# Patient Record
Sex: MALE | Race: OTHER | NOT HISPANIC OR LATINO | URBAN - METROPOLITAN AREA
[De-identification: names, ages, dates, MRNs, and addresses within clinical notes are randomized per-mention and may not be internally consistent; named-entity substitution may affect disease eponyms.]

---

## 2019-08-10 ENCOUNTER — INPATIENT (INPATIENT)
Facility: HOSPITAL | Age: 69
LOS: 2 days | Discharge: HOME | DRG: 247 | End: 2019-08-13
Attending: HOSPITALIST | Admitting: HOSPITALIST
Payer: COMMERCIAL

## 2019-08-10 VITALS
HEART RATE: 86 BPM | OXYGEN SATURATION: 99 % | WEIGHT: 207.01 LBS | RESPIRATION RATE: 18 BRPM | SYSTOLIC BLOOD PRESSURE: 173 MMHG | HEIGHT: 72 IN | DIASTOLIC BLOOD PRESSURE: 99 MMHG

## 2019-08-10 DIAGNOSIS — Z29.9 ENCOUNTER FOR PROPHYLACTIC MEASURES, UNSPECIFIED: ICD-10-CM

## 2019-08-10 DIAGNOSIS — I21.4 NON-ST ELEVATION (NSTEMI) MYOCARDIAL INFARCTION: ICD-10-CM

## 2019-08-10 DIAGNOSIS — R07.9 CHEST PAIN, UNSPECIFIED: ICD-10-CM

## 2019-08-10 DIAGNOSIS — Z87.19 PERSONAL HISTORY OF OTHER DISEASES OF THE DIGESTIVE SYSTEM: ICD-10-CM

## 2019-08-10 LAB
ALBUMIN SERPL ELPH-MCNC: 4.6 G/DL — SIGNIFICANT CHANGE UP (ref 3.3–5)
ALP SERPL-CCNC: 67 U/L — SIGNIFICANT CHANGE UP (ref 40–120)
ALT FLD-CCNC: 26 U/L — SIGNIFICANT CHANGE UP (ref 10–45)
ANION GAP SERPL CALC-SCNC: 13 MMOL/L — SIGNIFICANT CHANGE UP (ref 5–17)
APTT BLD: 32.6 SEC — SIGNIFICANT CHANGE UP (ref 27.5–36.3)
APTT BLD: 50.6 SEC — HIGH (ref 27.5–36.3)
APTT BLD: 54.7 SEC — HIGH (ref 27.5–36.3)
AST SERPL-CCNC: 24 U/L — SIGNIFICANT CHANGE UP (ref 10–40)
BASOPHILS # BLD AUTO: 0 K/UL — SIGNIFICANT CHANGE UP (ref 0–0.2)
BASOPHILS NFR BLD AUTO: 0.1 % — SIGNIFICANT CHANGE UP (ref 0–2)
BILIRUB SERPL-MCNC: 0.4 MG/DL — SIGNIFICANT CHANGE UP (ref 0.2–1.2)
BUN SERPL-MCNC: 15 MG/DL — SIGNIFICANT CHANGE UP (ref 7–23)
CALCIUM SERPL-MCNC: 9.6 MG/DL — SIGNIFICANT CHANGE UP (ref 8.4–10.5)
CHLORIDE SERPL-SCNC: 101 MMOL/L — SIGNIFICANT CHANGE UP (ref 96–108)
CK MB BLD-MCNC: 3 % — SIGNIFICANT CHANGE UP (ref 0–3.5)
CK MB CFR SERPL CALC: 2.6 NG/ML — SIGNIFICANT CHANGE UP (ref 0–6.7)
CK MB CFR SERPL CALC: 3.2 NG/ML — SIGNIFICANT CHANGE UP (ref 0–6.7)
CK SERPL-CCNC: 86 U/L — SIGNIFICANT CHANGE UP (ref 30–200)
CO2 SERPL-SCNC: 25 MMOL/L — SIGNIFICANT CHANGE UP (ref 22–31)
CREAT SERPL-MCNC: 0.98 MG/DL — SIGNIFICANT CHANGE UP (ref 0.5–1.3)
EOSINOPHIL # BLD AUTO: 0.1 K/UL — SIGNIFICANT CHANGE UP (ref 0–0.5)
EOSINOPHIL NFR BLD AUTO: 1.4 % — SIGNIFICANT CHANGE UP (ref 0–6)
GLUCOSE SERPL-MCNC: 125 MG/DL — HIGH (ref 70–99)
HBA1C BLD-MCNC: 5.7 % — HIGH (ref 4–5.6)
HCT VFR BLD CALC: 44.3 % — SIGNIFICANT CHANGE UP (ref 39–50)
HCT VFR BLD CALC: 46.2 % — SIGNIFICANT CHANGE UP (ref 39–50)
HGB BLD-MCNC: 15.1 G/DL — SIGNIFICANT CHANGE UP (ref 13–17)
HGB BLD-MCNC: 15.2 G/DL — SIGNIFICANT CHANGE UP (ref 13–17)
INR BLD: 1.14 RATIO — SIGNIFICANT CHANGE UP (ref 0.88–1.16)
LYMPHOCYTES # BLD AUTO: 2.3 K/UL — SIGNIFICANT CHANGE UP (ref 1–3.3)
LYMPHOCYTES # BLD AUTO: 24.1 % — SIGNIFICANT CHANGE UP (ref 13–44)
MCHC RBC-ENTMCNC: 28.9 PG — SIGNIFICANT CHANGE UP (ref 27–34)
MCHC RBC-ENTMCNC: 30.3 PG — SIGNIFICANT CHANGE UP (ref 27–34)
MCHC RBC-ENTMCNC: 32.7 GM/DL — SIGNIFICANT CHANGE UP (ref 32–36)
MCHC RBC-ENTMCNC: 34.3 GM/DL — SIGNIFICANT CHANGE UP (ref 32–36)
MCV RBC AUTO: 88.4 FL — SIGNIFICANT CHANGE UP (ref 80–100)
MCV RBC AUTO: 88.5 FL — SIGNIFICANT CHANGE UP (ref 80–100)
MONOCYTES # BLD AUTO: 0.5 K/UL — SIGNIFICANT CHANGE UP (ref 0–0.9)
MONOCYTES NFR BLD AUTO: 5.8 % — SIGNIFICANT CHANGE UP (ref 2–14)
NEUTROPHILS # BLD AUTO: 6.5 K/UL — SIGNIFICANT CHANGE UP (ref 1.8–7.4)
NEUTROPHILS NFR BLD AUTO: 68.5 % — SIGNIFICANT CHANGE UP (ref 43–77)
PLATELET # BLD AUTO: 213 K/UL — SIGNIFICANT CHANGE UP (ref 150–400)
PLATELET # BLD AUTO: 215 K/UL — SIGNIFICANT CHANGE UP (ref 150–400)
POTASSIUM SERPL-MCNC: 4 MMOL/L — SIGNIFICANT CHANGE UP (ref 3.5–5.3)
POTASSIUM SERPL-SCNC: 4 MMOL/L — SIGNIFICANT CHANGE UP (ref 3.5–5.3)
PROT SERPL-MCNC: 7.6 G/DL — SIGNIFICANT CHANGE UP (ref 6–8.3)
PROTHROM AB SERPL-ACNC: 13 SEC — HIGH (ref 10–12.9)
RBC # BLD: 5.01 M/UL — SIGNIFICANT CHANGE UP (ref 4.2–5.8)
RBC # BLD: 5.22 M/UL — SIGNIFICANT CHANGE UP (ref 4.2–5.8)
RBC # FLD: 11.6 % — SIGNIFICANT CHANGE UP (ref 10.3–14.5)
RBC # FLD: 11.7 % — SIGNIFICANT CHANGE UP (ref 10.3–14.5)
SODIUM SERPL-SCNC: 139 MMOL/L — SIGNIFICANT CHANGE UP (ref 135–145)
TROPONIN T, HIGH SENSITIVITY RESULT: 196 NG/L — HIGH (ref 0–51)
TROPONIN T, HIGH SENSITIVITY RESULT: 205 NG/L — HIGH (ref 0–51)
TROPONIN T, HIGH SENSITIVITY RESULT: 211 NG/L — HIGH (ref 0–51)
TROPONIN T, HIGH SENSITIVITY RESULT: 218 NG/L — HIGH (ref 0–51)
WBC # BLD: 9.3 K/UL — SIGNIFICANT CHANGE UP (ref 3.8–10.5)
WBC # BLD: 9.4 K/UL — SIGNIFICANT CHANGE UP (ref 3.8–10.5)
WBC # FLD AUTO: 9.3 K/UL — SIGNIFICANT CHANGE UP (ref 3.8–10.5)
WBC # FLD AUTO: 9.4 K/UL — SIGNIFICANT CHANGE UP (ref 3.8–10.5)

## 2019-08-10 PROCEDURE — 71046 X-RAY EXAM CHEST 2 VIEWS: CPT | Mod: 26

## 2019-08-10 PROCEDURE — 99223 1ST HOSP IP/OBS HIGH 75: CPT

## 2019-08-10 PROCEDURE — 99221 1ST HOSP IP/OBS SF/LOW 40: CPT

## 2019-08-10 PROCEDURE — 99291 CRITICAL CARE FIRST HOUR: CPT

## 2019-08-10 PROCEDURE — 99205 OFFICE O/P NEW HI 60 MIN: CPT | Mod: GC

## 2019-08-10 RX ORDER — TICAGRELOR 90 MG/1
90 TABLET ORAL EVERY 12 HOURS
Refills: 0 | Status: DISCONTINUED | OUTPATIENT
Start: 2019-08-10 | End: 2019-08-13

## 2019-08-10 RX ORDER — ASPIRIN/CALCIUM CARB/MAGNESIUM 324 MG
81 TABLET ORAL DAILY
Refills: 0 | Status: DISCONTINUED | OUTPATIENT
Start: 2019-08-10 | End: 2019-08-13

## 2019-08-10 RX ORDER — TICAGRELOR 90 MG/1
180 TABLET ORAL ONCE
Refills: 0 | Status: COMPLETED | OUTPATIENT
Start: 2019-08-10 | End: 2019-08-10

## 2019-08-10 RX ORDER — ATORVASTATIN CALCIUM 80 MG/1
80 TABLET, FILM COATED ORAL AT BEDTIME
Refills: 0 | Status: DISCONTINUED | OUTPATIENT
Start: 2019-08-10 | End: 2019-08-13

## 2019-08-10 RX ORDER — ASPIRIN/CALCIUM CARB/MAGNESIUM 324 MG
162 TABLET ORAL ONCE
Refills: 0 | Status: DISCONTINUED | OUTPATIENT
Start: 2019-08-10 | End: 2019-08-10

## 2019-08-10 RX ORDER — HEPARIN SODIUM 5000 [USP'U]/ML
INJECTION INTRAVENOUS; SUBCUTANEOUS
Qty: 25000 | Refills: 0 | Status: DISCONTINUED | OUTPATIENT
Start: 2019-08-10 | End: 2019-08-12

## 2019-08-10 RX ORDER — HEPARIN SODIUM 5000 [USP'U]/ML
4000 INJECTION INTRAVENOUS; SUBCUTANEOUS EVERY 6 HOURS
Refills: 0 | Status: DISCONTINUED | OUTPATIENT
Start: 2019-08-10 | End: 2019-08-12

## 2019-08-10 RX ORDER — METOPROLOL TARTRATE 50 MG
12.5 TABLET ORAL EVERY 12 HOURS
Refills: 0 | Status: DISCONTINUED | OUTPATIENT
Start: 2019-08-10 | End: 2019-08-13

## 2019-08-10 RX ORDER — HEPARIN SODIUM 5000 [USP'U]/ML
4000 INJECTION INTRAVENOUS; SUBCUTANEOUS ONCE
Refills: 0 | Status: COMPLETED | OUTPATIENT
Start: 2019-08-10 | End: 2019-08-10

## 2019-08-10 RX ADMIN — Medication 12.5 MILLIGRAM(S): at 17:56

## 2019-08-10 RX ADMIN — ATORVASTATIN CALCIUM 80 MILLIGRAM(S): 80 TABLET, FILM COATED ORAL at 21:15

## 2019-08-10 RX ADMIN — HEPARIN SODIUM 4000 UNIT(S): 5000 INJECTION INTRAVENOUS; SUBCUTANEOUS at 03:19

## 2019-08-10 RX ADMIN — Medication 1 TABLET(S): at 17:57

## 2019-08-10 RX ADMIN — TICAGRELOR 180 MILLIGRAM(S): 90 TABLET ORAL at 05:20

## 2019-08-10 RX ADMIN — Medication 81 MILLIGRAM(S): at 17:56

## 2019-08-10 RX ADMIN — HEPARIN SODIUM 1000 UNIT(S)/HR: 5000 INJECTION INTRAVENOUS; SUBCUTANEOUS at 03:20

## 2019-08-10 RX ADMIN — TICAGRELOR 90 MILLIGRAM(S): 90 TABLET ORAL at 17:57

## 2019-08-10 RX ADMIN — HEPARIN SODIUM 1200 UNIT(S)/HR: 5000 INJECTION INTRAVENOUS; SUBCUTANEOUS at 18:30

## 2019-08-10 NOTE — ED PROVIDER NOTE - NS ED ROS FT

## 2019-08-10 NOTE — ED PROVIDER NOTE - OBJECTIVE STATEMENT
70 yo M no pmhx pw chest pain. patient reports acute onset chest pain starting on monday night in left axilla after working out on an elliptical. reports pain exacerbated by exercise and position. does not radiate and is intermittent in nature for the last few days. episodes last for a couple hours at a time. denies hx of chest pain. not aw Dyspnea or palpitations. Denies fever, chills, sweats, fatigue, weight loss, Alcohol, tobacco, or illicit drug use. no hx of Vascular or pulmonary disease, history of ulcer, previous hospitalizations or evaluations for chest pain. no hx of blood clots, hx of cancer. no hx of stress testing or echocardiogram. took 4 baby aspirin at home. no chest pain right now

## 2019-08-10 NOTE — ED PROVIDER NOTE - CLINICAL SUMMARY MEDICAL DECISION MAKING FREE TEXT BOX
see attending attestation authored by me, Lalo Chowdhury MD see attending attestation authored by me, Lalo Chowdhury MD    70 yo pw chest pain, ekg shows twave inversions without prev comparison pt on monitor, enzymes, aspirin taken at home, cxr for pneumothorax or infiltrates, less likely PE with low risk on wells and PERC neg, unlikely dissection will likely admit for dynamic testing in setting of abnormal ekg

## 2019-08-10 NOTE — H&P ADULT - NSHPPHYSICALEXAM_GEN_ALL_CORE
Vital Signs Last 24 Hrs  T(C): 36.7 (10 Aug 2019 06:27), Max: 36.7 (10 Aug 2019 03:21)  T(F): 98.1 (10 Aug 2019 06:27), Max: 98.1 (10 Aug 2019 06:27)  HR: 72 (10 Aug 2019 06:27) (70 - 90)  BP: 138/82 (10 Aug 2019 06:27) (138/82 - 173/99)  BP(mean): --  RR: 16 (10 Aug 2019 06:27) (16 - 18)  SpO2: 100% (10 Aug 2019 06:27) (99% - 100%)    PHYSICAL EXAM:  GENERAL: NAD, well-groomed, well-developed  HEAD:  Atraumatic, Normocephalic  EYES: EOMI, PERRLA, conjunctiva and sclera clear  ENMT: No oropharyngeal exudates, erythema or lesions,  Moist mucous membranes, good dentition  NECK: Supple, no cervical lymphadenopathy, no JVD  NERVOUS SYSTEM:  Alert & Oriented X3, CN II-XII intact, 5/5 BUE and BLE motor strength, full sensation to light touch   CHEST/LUNG: Clear to auscultation bilaterally; No rales, no  rhonchi, no wheezing  Chest pain is not reproducible with palpation  HEART: Regular rate and rhythm; No murmurs, rubs, or gallops  ABDOMEN: Soft, Nontender, Nondistended  EXTREMITIES:  2+ radial and DP pulses, No clubbing, cyanosis, or edema  LYMPH: No lymphadenopathy noted  SKIN: No rashes or lesions

## 2019-08-10 NOTE — H&P ADULT - ASSESSMENT
This patient is a 69yoM with PMH of gastric ulcers who presents to the ED with complaint of chest pain. This patient is from [place] and travels for work. 5 days prior to admission, the patient was in [place], [place] for work and had exercised at a gym. He used the elliptical machine. The following day, he experienced new onset of pain at the L axilla, which felt a pulled muscle. He attributed the pain to having moved his arms vigorously while on the elliptical. The pain improved with massage. He had no lightheadedness, palpitations, SOB, cough, or radiation of the pain. He had recurrent episodes of the pain every 8-10 hours for the following 4 days. The patient traveled to [place] to visit his grandchildren and was advised by his daughter in law to visit the urgent care center. At urgent care, he was provided with 4 aspirin. He was found to have an abnormal EKG and referred to the ED for further evaluation.  The patient has no history of MI. His brother  in bed unexpectedly at age 45 and the death was attributed to heart disease, however this was not verified. The patient denies history of tobacco use and states that he can readily climb 2 flights of stairs without CP, palpitations, dyspnea or lightheadedness.    In the ED, T  , HR 86, /99, RR 18, SpO2 99%RA This patient is a 69yoM with PMH of gastric ulcers who presents to the ED with complaint of chest pain, found to have elevated troponins and TWI on EKG consistent with NSTEMI.

## 2019-08-10 NOTE — CONSULT NOTE ADULT - ATTENDING COMMENTS
Patient seen and examined. Agree with assessment and plan as outlined above. Patient with no PMH with brother who had suddenly  in his sleep in his 40s presents with chest pain after exertion and more recently with exertion. He was noted to have slowly uptrending HS-trop around 200. Patient is currently pain free. Given symptoms and trop would treat as NSTEMI. Heparin gtt, DAPT, Statin, beta blocker. Check echo. Plan for cath Monday. If symptoms return or patient becomes HD unstable will need cath emergently.

## 2019-08-10 NOTE — CHART NOTE - NSCHARTNOTEFT_GEN_A_CORE
Interval events    CC: Trop 218    Trop trend:->->    Patient with no c/o CP now      70 yo male, no PMH, reported brother  of heart disease in his 40s, admitted with NSTEMI  - c/w heparin gtt, Lipitor 80, BB, ASA, Brilinta  Will trend trop  Will discuss with cardiology Fellow  Will follow    Gonzalez Lama [facility] [place]  [id number]

## 2019-08-10 NOTE — ED ADULT NURSE NOTE - OBJECTIVE STATEMENT
70 y/o male with no PMH arrives to ED with c/o intermittent chest pain since Monday. Patient reports he was exercising on Elliptical on Monday, after felt tightness and pain to left pectoral region. Pain is nonradiating Throughout week patient reports he had pain on and off. Patient states he travels often for work, was traveling to [place] to visit family, after flight patient felt chest pain took two baby aspirin. Family took patient to urgent care where EKG was concerning and told to come to ED, took two more aspirin prior to arrival. Patient is a/ox4, denies shortness of breath, dyspnea. Denies abdomen pain, n/v/d. Denies taking any daily medications, reports he sees his PCP annually, has never been told EKG is abnormal. Patient placed on CM NSR, IV 18g placed to left arm, labs sent as ordered.

## 2019-08-10 NOTE — ED PROVIDER NOTE - CRITICAL CARE INDICATION, MLM
Called and left a detailed message for the patient regarding his lab results. He does have vitamin D deficiency. Prescription for 2000 international units of vitamin D sent to pharmacy. Patient should follow-up p.r.n., he should contact us if he has any questions   patient was critically ill... Patient was critically ill with a high probability of imminent or life threatening deterioration.

## 2019-08-10 NOTE — H&P ADULT - PROBLEM SELECTOR PLAN 1
The patient was found to have elevated and rising cardiac enzymes, T-wave inversions on EKG in setting of intermittent L sided chest pain, raising concern for NSTEMI.  Monitor on telemetry.  House cardiology team was consulted; follow up their recommendations.   Continue  heparin gtt. The patient was found to have elevated and rising cardiac enzymes, T-wave inversions on EKG in setting of intermittent L sided chest pain, raising concern for NSTEMI.  Monitor on telemetry.  House cardiology team was consulted; follow up their recommendations.   Continue heparin gtt.  Patient was aspirin and brilinta loaded in ED.  Start aspirin 81mg PO qd, brilinta 90mg PO qd, lipitor 80mg PO qd, metoprolol 12.5mg PO BID with hold parameters.  Will obtain TTE to assess for structural heart disease.   Per cardiology team, patient is planned for angiography on Monday.  Will trend cardiac enzymes.   Check lipid panel, A1C. Patient denies history of tobacco use.  Aside from multivitamin, will hold other home supplements.

## 2019-08-10 NOTE — H&P ADULT - NSHPSOCIALHISTORY_GEN_ALL_CORE
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  He denies tobacco, alcohol or drug use.

## 2019-08-10 NOTE — H&P ADULT - HISTORY OF PRESENT ILLNESS
In the ED, T  , HR 86, /99, RR 18, SpO2 99%RA      CBC is unremarkable.   HS troponin T rising from 196 to 205.  CMP is unremarkable.    CXR ok This patient is a 69yoM with PMH of gastric ulcers who presents to the ED with complaint of chest pain. This patient is from [place] and travels for work. 5 days prior to admission, the patient was in [place], [place] for work and had exercised at a gym. He used the elliptical machine. The following day, he experienced new onset of pain at the L axilla, which felt a pulled muscle. He attributed the pain to having moved his arms vigorously while on the elliptical. The pain improved with massage. He had no lightheadedness, palpitations, SOB, cough, or radiation of the pain. He had recurrent episodes of the pain every 8-10 hours for the following 4 days. The patient traveled to [place] to visit his grandchildren and was advised by his daughter in law to visit the urgent care center. At urgent care, he was provided with 4 aspirin. He was found to have an abnormal EKG and referred to the ED for further evaluation.  The patient has no history of MI. His brother  in bed unexpectedly at age 45 and the death was attributed to heart disease, however this was not verified. The patient denies history of tobacco use and states that he can readily climb 2 flights of stairs without CP, palpitations, dyspnea or lightheadedness.    In the ED, T  , HR 86, /99, RR 18, SpO2 99%RA

## 2019-08-10 NOTE — H&P ADULT - NSHPREVIEWOFSYSTEMS_GEN_ALL_CORE
REVIEW OF SYSTEMS  CONSTITUTIONAL: No fever, no chills, no fatigue  EYES: No eye pain, no vision changes  ENMT:  No difficulty hearing, no throat pain  RESPIRATORY: No cough, no wheezing, no sputum production; No shortness of breath  CARDIOVASCULAR: + chest pain, no palpitations, no ALVARADO, no leg swelling  GASTROINTESTINAL: No abdominal pain, no nausea, no vomiting, no diarrhea, no constipation, no melena, no hematochezia.  GENITOURINARY: No dysuria, no hematuria  NEUROLOGICAL: No headaches, no loss of strength, no numbness  SKIN: No itching, no rashes, no lesions   MUSCULOSKELETAL: No joint pain, no joint swelling; No muscle pain  HEME/LYMPH: No easy bruising, bleeding

## 2019-08-10 NOTE — ED ADULT NURSE REASSESSMENT NOTE - NS ED NURSE REASSESS COMMENT FT1
Patient heparin infusion started, verbalized understanding of need for intervention. Instructed to notify RN or staff if bleeding occurs. CM in place hr 70's NSR, no chest pain at this time. Safety maintained. Awaiting cardiology consult.

## 2019-08-10 NOTE — H&P ADULT - PROBLEM SELECTOR PLAN 2
I discussed with the patient potential benefits and risks of anticoagulation, including but not limited to higher risk of bleeding and instructed patient to report any witnessed epistaxis, gingival bleeding, melena, hematochezia, hematuria or other signs or symptoms of bleeding or bruising. Patient expressed understanding.   Patient reported that he had a gastric ulcer in 2000 related to prophylactic aspirin use, and thus he had stopped taking aspirin.  Monitor PTT while patient is on heparin gtt.  Low threshold to start PPI if patient develops any symptoms of gastritis or dyspepsia.

## 2019-08-10 NOTE — ED PROVIDER NOTE - ATTENDING CONTRIBUTION TO CARE
70 yo male denies significant PMH p/w several days of intermittent L sided CP, moderate, nonradiating, began after using the elliptical at the gym.  pain free currently.  no recent illness.  took 181 ASA PTA.  give another 181, check labs/hST, CXR, likely admit for further management.  EKG without STEMI but with precordial TWI.

## 2019-08-10 NOTE — PATIENT PROFILE ADULT - PRO INTERPRETER NEED 2
Assumed care at [address], forgetful. VSS  Denies lightheadedness, dizziness. No signs of cardiac or respiratory distress  SB on tele  Daughter at the bedside, will stay overnight. Due medications given, needs attended, will continue to monitor.   Plan: English

## 2019-08-10 NOTE — ED PROVIDER NOTE - PROGRESS NOTE DETAILS
patient with elevated trop, cards consulted, heparin gtt patient was admitted to medicine for further evaluation and treatment/management, heparin gtt, brillinta, patient in nad, resting comfortably

## 2019-08-10 NOTE — ED PROVIDER NOTE - PHYSICAL EXAMINATION
----- Message from Kenna Galvan sent at 7/24/2017  4:13 PM CDT -----  Returning nurse call. Please call back at [phone]. thanks   Physical Exam:  Gen: NAD, AOx3, non-toxic appearing  Head: NCAT  HEENT: EOMI, PEERLA, normal conjunctiva, tongue midline, oral mucosa moist  Lung: CTAB, no respiratory distress, no wheezes/rhonchi/rales B/L, speaking in full sentences  CV: RRR, no murmurs, rubs or gallops, Left axillary muscular ttp   Abd: soft, NT, ND, no guarding, no rigidity, no rebound tenderness, no CVA tenderness   MSK: no visible deformities, ROM normal in UE/LE, no back pain  Neuro: No focal sensory or motor deficits  Skin: Warm, well perfused, no rash, no leg swelling  Psych: normal affect, calm  ~Pasquale Baron D.O. -Resident

## 2019-08-10 NOTE — PROGRESS NOTE ADULT - SUBJECTIVE AND OBJECTIVE BOX
This patient is a 69yoM with PMH of gastric ulcers who presents to the ED with complaint of chest pain. This patient is from [place] and travels for work. 5 days prior to admission, the patient was in [place], [place] for work and had exercised at a gym. He used the elliptical machine. The following day, he experienced new onset of pain at the L axilla, which felt a pulled muscle. He attributed the pain to having moved his arms vigorously while on the elliptical. The pain improved with massage. He had no lightheadedness, palpitations, SOB, cough, or radiation of the pain. He had recurrent episodes of the pain every 8-10 hours for the following 4 days. The patient traveled to [place] to visit his grandchildren and was advised by his daughter in law to visit the urgent care center. At urgent care, he was provided with 4 aspirin. He was found to have an abnormal EKG and referred to the ED for further evaluation.  The patient has no history of MI. His brother  in bed unexpectedly at age 45 and the death was attributed to heart disease, however this was not verified. The patient denies history of tobacco use and states that he can readily climb 2 flights of stairs without CP, palpitations, dyspnea or lightheadedness.    REVIEW OF SYSTEMS:  General: NAD, hemodynamically stable, (-)  fever, (-) chills, (-) weakness  HEENT:  Eyes:  No visual loss, blurred vision, double vision or yellow sclerae. Ears, Nose, Throat:  No hearing loss, sneezing, congestion, runny nose or sore throat.  SKIN:  No rash or itching.  CARDIOVASCULAR:  No chest pain, chest pressure or chest discomfort. No palpitations or edema.  RESPIRATORY:  No shortness of breath, cough or sputum.  GASTROINTESTINAL:  No anorexia, nausea, vomiting or diarrhea. No abdominal pain or blood.  NEUROLOGICAL:  No headache, dizziness, syncope, paralysis, ataxia, numbness or tingling in the extremities. No change in bowel or bladder control.  MUSCULOSKELETAL:  No muscle, back pain, joint pain or stiffness.  HEMATOLOGIC:  No anemia, bleeding or bruising.  LYMPHATICS:  No enlarged nodes. No history of splenectomy.  ENDOCRINOLOGIC:  No reports of sweating, cold or heat intolerance. No polyuria or polydipsia.  ALLERGIES:  No history of asthma, hives, eczema or rhinitis.    Physical Exam:   GENERAL APPEARANCE:  NAD, hemodynamically stable  T(C): 36.4 (08-10-19 @ 14:48), Max: 36.9 (08-10-19 @ 13:15)  HR: 73 (08-10-19 @ 14:48) (70 - 90)  BP: 142/84 (08-10-19 @ 14:48) (138/82 - 174/89)  RR: 18 (08-10-19 @ 14:48) (16 - 18)  SpO2: 98% (08-10-19 @ 14:48) (98% - 100%)  Wt(kg): --  HEENT:  Head is normocephalic    Skin:  Warm and dry without any rash   NECK:  Supple without lymphadenopathy.   HEART:  Regular rate and rhythm. normal S1 and S2, No M/R/G  LUNGS:  Good ins/exp effort, no W/R/R/C  ABDOMEN:  Soft, nontender, nondistended with good bowel sounds heard  EXTREMITIES:  Without cyanosis, clubbing or edema.   NEUROLOGICAL:  Gross nonfocal       CBC Full  -  ( 10 Aug 2019 09:43 )  WBC Count : 9.3 K/uL  RBC Count : 5.22 M/uL  Hemoglobin : 15.1 g/dL  Hematocrit : 46.2 %  Platelet Count - Automated : 213 K/uL  Mean Cell Volume : 88.5 fl  Mean Cell Hemoglobin : 28.9 pg  Mean Cell Hemoglobin Concentration : 32.7 gm/dL  Auto Neutrophil # : x  Auto Lymphocyte # : x  Auto Monocyte # : x  Auto Eosinophil # : x  Auto Basophil # : x  Auto Neutrophil % : x  Auto Lymphocyte % : x  Auto Monocyte % : x  Auto Eosinophil % : x  Auto Basophil % : x    PT/INR - ( 10 Aug 2019 02:22 )   PT: 13.0 sec;   INR: 1.14 ratio         PTT - ( 10 Aug 2019 09:43 )  PTT:54.7 sec    08-10    139  |  101  |  15  ----------------------------<  125<H>  4.0   |  25  |  0.98    Ca    9.6      10 Aug 2019 01:11    TPro  7.6  /  Alb  4.6  /  TBili  0.4  /  DBili  x   /  AST  24  /  ALT  26  /  AlkPhos  67  08-10    # NSTEMI  - Hemodynamically stable, chest pain has resolved  - ASA /  Brilinta / STATIN / BB  - heparin drip  - Start Lipitor 80  - Check TTE  - trend trops  - Will plan for angiogram on Monday.    # History of gastric ulcer  - witnessed epistaxis, gingival bleeding, melena, hematochezia, hematuria or other signs or symptoms of bleeding or bruising. Patient expressed understanding.   - Patient reported that he had a gastric ulcer in  related to prophylactic aspirin use, and thus he had stopped taking aspirin.  - Monitor PTT while patient is on heparin gtt.  - Low threshold to start PPI if patient develops any symptoms of gastritis or dyspepsia. This patient is a 69yoM with PMH of gastric ulcers who presents to the ED with complaint of chest pain. This patient is from [place] and travels for work. 5 days prior to admission, the patient was in [place], [place] for work and had exercised at a gym. He used the elliptical machine. The following day, he experienced new onset of pain at the L axilla, which felt a pulled muscle. He attributed the pain to having moved his arms vigorously while on the elliptical. The pain improved with massage. He had no lightheadedness, palpitations, SOB, cough, or radiation of the pain. He had recurrent episodes of the pain every 8-10 hours for the following 4 days. The patient traveled to [place] to visit his grandchildren and was advised by his daughter in law to visit the urgent care center. At urgent care, he was provided with 4 aspirin. He was found to have an abnormal EKG and referred to the ED for further evaluation.  The patient has no history of MI. His brother  in bed unexpectedly at age 45 and the death was attributed to heart disease, however this was not verified. The patient denies history of tobacco use and states that he can readily climb 2 flights of stairs without CP, palpitations, dyspnea or lightheadedness.    CHEST PAIN FREE and patient is hemodynamically stable. In addition, patient's care discussed with cardiology.     REVIEW OF SYSTEMS:  General: NAD, hemodynamically stable, (-)  fever, (-) chills, (-) weakness  HEENT:  Eyes:  No visual loss, blurred vision, double vision or yellow sclerae. Ears, Nose, Throat:  No hearing loss, sneezing, congestion, runny nose or sore throat.  SKIN:  No rash or itching.  CARDIOVASCULAR:  No chest pain, chest pressure or chest discomfort. No palpitations or edema.  RESPIRATORY:  No shortness of breath, cough or sputum.  GASTROINTESTINAL:  No anorexia, nausea, vomiting or diarrhea. No abdominal pain or blood.  NEUROLOGICAL:  No headache, dizziness, syncope, paralysis, ataxia, numbness or tingling in the extremities. No change in bowel or bladder control.  MUSCULOSKELETAL:  No muscle, back pain, joint pain or stiffness.  HEMATOLOGIC:  No anemia, bleeding or bruising.  LYMPHATICS:  No enlarged nodes. No history of splenectomy.  ENDOCRINOLOGIC:  No reports of sweating, cold or heat intolerance. No polyuria or polydipsia.  ALLERGIES:  No history of asthma, hives, eczema or rhinitis.    Physical Exam:   GENERAL APPEARANCE:  NAD, hemodynamically stable  T(C): 36.4 (08-10-19 @ 14:48), Max: 36.9 (08-10-19 @ 13:15)  HR: 73 (08-10-19 @ 14:48) (70 - 90)  BP: 142/84 (08-10-19 @ 14:48) (138/82 - 174/89)  RR: 18 (08-10-19 @ 14:48) (16 - 18)  SpO2: 98% (08-10-19 @ 14:48) (98% - 100%)  Wt(kg): --  HEENT:  Head is normocephalic    Skin:  Warm and dry without any rash   NECK:  Supple without lymphadenopathy.   HEART:  Regular rate and rhythm. normal S1 and S2, No M/R/G  LUNGS:  Good ins/exp effort, no W/R/R/C  ABDOMEN:  Soft, nontender, nondistended with good bowel sounds heard  EXTREMITIES:  Without cyanosis, clubbing or edema.   NEUROLOGICAL:  Gross nonfocal       CBC Full  -  ( 10 Aug 2019 09:43 )  WBC Count : 9.3 K/uL  RBC Count : 5.22 M/uL  Hemoglobin : 15.1 g/dL  Hematocrit : 46.2 %  Platelet Count - Automated : 213 K/uL  Mean Cell Volume : 88.5 fl  Mean Cell Hemoglobin : 28.9 pg  Mean Cell Hemoglobin Concentration : 32.7 gm/dL  Auto Neutrophil # : x  Auto Lymphocyte # : x  Auto Monocyte # : x  Auto Eosinophil # : x  Auto Basophil # : x  Auto Neutrophil % : x  Auto Lymphocyte % : x  Auto Monocyte % : x  Auto Eosinophil % : x  Auto Basophil % : x    PT/INR - ( 10 Aug 2019 02:22 )   PT: 13.0 sec;   INR: 1.14 ratio         PTT - ( 10 Aug 2019 09:43 )  PTT:54.7 sec    08-10    139  |  101  |  15  ----------------------------<  125<H>  4.0   |  25  |  0.98    Ca    9.6      10 Aug 2019 01:11    TPro  7.6  /  Alb  4.6  /  TBili  0.4  /  DBili  x   /  AST  24  /  ALT  26  /  AlkPhos  67  08-10    # NSTEMI  - Hemodynamically stable, chest pain has resolved  - ASA /  Brilinta / STATIN / BB  - heparin drip  - Start Lipitor 80  - Check TTE  - trend trops  - Will plan for angiogram on Monday.    # History of gastric ulcer  - witnessed epistaxis, gingival bleeding, melena, hematochezia, hematuria or other signs or symptoms of bleeding or bruising. Patient expressed understanding.   - Patient reported that he had a gastric ulcer in  related to prophylactic aspirin use, and thus he had stopped taking aspirin.  - Monitor PTT while patient is on heparin gtt.  - Low threshold to start PPI if patient develops any symptoms of gastritis or dyspepsia.

## 2019-08-10 NOTE — ED ADULT NURSE REASSESSMENT NOTE - NS ED NURSE REASSESS COMMENT FT1
Patient resting comfortably in stretcher, no acute distress. CM in place NSR 70's. Awaiting bed at this time.

## 2019-08-10 NOTE — PATIENT PROFILE ADULT - NSPROCHRONICPAIN_GEN_A_NUR
What Type Of Note Output Would You Prefer (Optional)?: Bullet Format
How Severe Is Your Skin Lesion?: mild
Has Your Skin Lesion Been Treated?: not been treated
Is This A New Presentation, Or A Follow-Up?: Skin Lesions
no

## 2019-08-10 NOTE — H&P ADULT - NSHPLABSRESULTS_GEN_ALL_CORE
Labs, imaging and EKG personally reviewed by me.     EKG HR 84, sinus TWI in V3-V6, II, III, AVF, QTc 430    LABS:                        15.2   9.4   )-----------( 215      ( 10 Aug 2019 01:11 )             44.3     Hgb Trend: 15.2<--  08-10    139  |  101  |  15  ----------------------------<  125<H>  4.0   |  25  |  0.98    Ca    9.6      10 Aug 2019 01:11    TPro  7.6  /  Alb  4.6  /  TBili  0.4  /  DBili  x   /  AST  24  /  ALT  26  /  AlkPhos  67  08-10    Creatinine Trend: 0.98<--  PT/INR - ( 10 Aug 2019 02:22 )   PT: 13.0 sec;   INR: 1.14 ratio         PTT - ( 10 Aug 2019 02:22 )  PTT:32.6 sec    < from: Xray Chest 2 Views PA/Lat (08.10.19 @ 01:15) >    PROCEDURE DATE:  08/10/2019      ******PRELIMINARY REPORT******    ******PRELIMINARY REPORT******          INTERPRETATION:  Clear lungs.      < end of copied text >

## 2019-08-10 NOTE — CONSULT NOTE ADULT - ASSESSMENT
70 yo male, no PMH, reported brother  of heart disease in his 40s, presents with intermittent chest pain since Tuesday morning, noted to have elevated cardiac enzymes.    NSTEMI  - Hemodynamically stable, chest pain has resolved  - ASA and Brilinta loaded in ED. c/w ASA 81 daily, Brilinta 90 BID  - c/w heparin gtt  - Start Lipitor 80  - Start Metoprolol 12.5 BID with hold parameters  - Check TTE  - Admit to tele, trend cardiac enzymes  - Will plan for angiogram on Monday. If chest pain recurs please call cardiology to re-evaluate the pt for need for more urgent angiogram.    Apolinar Ruiz MD  Cardiology Fellow  [phone]  All Cardiology service information can be found  on [email or web address], password: [email or web address]

## 2019-08-10 NOTE — CONSULT NOTE ADULT - SUBJECTIVE AND OBJECTIVE BOX
CHIEF COMPLAINT: Chest pain    HISTORY OF PRESENT ILLNESS: 70 yo male, no PMH, reported brother  of heart disease in his 40s, presents with intermittent chest pain since Tuesday morning. Pt states on Monday evening he worked out heavily at the gym. On Tuesday morning when he awoke he felt pain in his L axilla. The pain was intermittent, lasting for minutes, and then resolving. As the pain continued to recur he noticed it also was along the L side of his chest. He describes the discomfort as a "deep pain." He initially attributed the pain to a pulled muscle but continued to experience the pain today so he was urged by his family to go to urgent care. At urgent care he was told he had an abnormal EKG and sent to the ED. At this time pt reports his symptoms have completely resolved. He denies any recent dyspnea, peripheral edema, orthopnea.      Allergies    No Known Allergies    Intolerances    	    MEDICATIONS:  heparin  Infusion.  Unit(s)/Hr IV Continuous <Continuous>  heparin  Injectable 4000 Unit(s) IV Push every 6 hours PRN  ticagrelor 180 milliGRAM(s) Oral Once                  PAST MEDICAL & SURGICAL HISTORY: Denied      FAMILY HISTORY: Brother  of heart disease in his 40s      SOCIAL HISTORY:    Non-smoker, rare EtOH use        REVIEW OF SYSTEMS:  General: no fatigue/malaise, weight loss/gain.  Skin: no rashes.  Ophthalmologic: no blurred vision, no loss of vision. 	  ENT: no sore throat, rhinorrhea, sinus congestion.  Respiratory: no SOB, cough or wheeze.  Gastrointestinal:  no N/V/D, no melena/hematemesis/hematochezia.  Genitourinary: no dysuria/hesitancy or hematuria.  Musculoskeletal: no myalgias or arthralgias.  Neurological: no changes in vision or hearing, no lightheadedness/dizziness, no syncope/near syncope	  Psychiatric: no unusual stress/anxiety.   Hematology/Lymphatics: no unusual bleeding, bruising and no lymphadenopathy.  Endocrine: no unusual thirst.   All others negative except as stated above and in HPI.    PHYSICAL EXAM:  T(C): 36.7 (08-10-19 @ 03:21), Max: 36.7 (08-10-19 @ 03:21)  HR: 70 (08-10-19 @ 03:21) (70 - 90)  BP: 145/91 (08-10-19 @ 03:21) (145/91 - 173/99)  RR: 18 (08-10-19 @ 03:21) (18 - 18)  SpO2: 99% (08-10-19 @ 03:21) (99% - 99%)  Wt(kg): --  I&O's Summary      Appearance: No distress	  HEENT:   Normal oral mucosa, PERRL, EOMI	  Lymphatic: No lymphadenopathy  Cardiovascular: Normal S1 S2, No JVD, No murmurs, No edema  Respiratory: Lungs clear to auscultation	  Psychiatry: A & O x 3, Mood & affect appropriate  Gastrointestinal:  Soft, Non-tender, + BS	  Skin: No rashes, No ecchymoses, No cyanosis	  Neurologic: Non-focal  Extremities: Normal range of motion, No clubbing, cyanosis or edema  Vascular: Peripheral pulses palpable 2+ bilaterally        LABS:	 	    CBC Full  -  ( 10 Aug 2019 01:11 )  WBC Count : 9.4 K/uL  Hemoglobin : 15.2 g/dL  Hematocrit : 44.3 %  Platelet Count - Automated : 215 K/uL  Mean Cell Volume : 88.4 fl  Mean Cell Hemoglobin : 30.3 pg  Mean Cell Hemoglobin Concentration : 34.3 gm/dL  Auto Neutrophil # : 6.5 K/uL  Auto Lymphocyte # : 2.3 K/uL  Auto Monocyte # : 0.5 K/uL  Auto Eosinophil # : 0.1 K/uL  Auto Basophil # : 0.0 K/uL  Auto Neutrophil % : 68.5 %  Auto Lymphocyte % : 24.1 %  Auto Monocyte % : 5.8 %  Auto Eosinophil % : 1.4 %  Auto Basophil % : 0.1 %    08-10    139  |  101  |  15  ----------------------------<  125<H>  4.0   |  25  |  0.98    Ca    9.6      10 Aug 2019 01:11    TPro  7.6  /  Alb  4.6  /  TBili  0.4  /  DBili  x   /  AST  24  /  ALT  26  /  AlkPhos  67  08-10      proBNP:   Lipid Profile:   HgA1c:   TSH:       CARDIAC MARKERS:            TELEMETRY: 	    ECG:  	Sinus HR 84 T wave inversions II, III, aVF, V3-V6  RADIOLOGY:  OTHER: 	    PREVIOUS DIAGNOSTIC TESTING:    [ ] Echocardiogram:  [ ]  Catheterization:  [ ] Stress Test:

## 2019-08-11 LAB
APTT BLD: 64.9 SEC — HIGH (ref 27.5–36.3)
APTT BLD: 67.1 SEC — HIGH (ref 27.5–36.3)
CHOLEST SERPL-MCNC: 182 MG/DL — SIGNIFICANT CHANGE UP (ref 10–199)
HCT VFR BLD CALC: 44 % — SIGNIFICANT CHANGE UP (ref 39–50)
HCV AB S/CO SERPL IA: 0.11 S/CO — SIGNIFICANT CHANGE UP (ref 0–0.99)
HCV AB SERPL-IMP: SIGNIFICANT CHANGE UP
HDLC SERPL-MCNC: 36 MG/DL — LOW
HGB BLD-MCNC: 14.7 G/DL — SIGNIFICANT CHANGE UP (ref 13–17)
LIPID PNL WITH DIRECT LDL SERPL: 119 MG/DL — HIGH
MCHC RBC-ENTMCNC: 29.5 PG — SIGNIFICANT CHANGE UP (ref 27–34)
MCHC RBC-ENTMCNC: 33.4 GM/DL — SIGNIFICANT CHANGE UP (ref 32–36)
MCV RBC AUTO: 88.4 FL — SIGNIFICANT CHANGE UP (ref 80–100)
PLATELET # BLD AUTO: 198 K/UL — SIGNIFICANT CHANGE UP (ref 150–400)
RBC # BLD: 4.98 M/UL — SIGNIFICANT CHANGE UP (ref 4.2–5.8)
RBC # FLD: 12.2 % — SIGNIFICANT CHANGE UP (ref 10.3–14.5)
TOTAL CHOLESTEROL/HDL RATIO MEASUREMENT: 5.1 RATIO — SIGNIFICANT CHANGE UP (ref 3.4–9.6)
TRIGL SERPL-MCNC: 133 MG/DL — SIGNIFICANT CHANGE UP (ref 10–149)
TROPONIN T, HIGH SENSITIVITY RESULT: 229 NG/L — HIGH (ref 0–51)
TROPONIN T, HIGH SENSITIVITY RESULT: 245 NG/L — HIGH (ref 0–51)
WBC # BLD: 8.69 K/UL — SIGNIFICANT CHANGE UP (ref 3.8–10.5)
WBC # FLD AUTO: 8.69 K/UL — SIGNIFICANT CHANGE UP (ref 3.8–10.5)

## 2019-08-11 PROCEDURE — 99231 SBSQ HOSP IP/OBS SF/LOW 25: CPT | Mod: GC

## 2019-08-11 PROCEDURE — 99232 SBSQ HOSP IP/OBS MODERATE 35: CPT | Mod: GC

## 2019-08-11 RX ADMIN — ATORVASTATIN CALCIUM 80 MILLIGRAM(S): 80 TABLET, FILM COATED ORAL at 21:33

## 2019-08-11 RX ADMIN — TICAGRELOR 90 MILLIGRAM(S): 90 TABLET ORAL at 17:00

## 2019-08-11 RX ADMIN — TICAGRELOR 90 MILLIGRAM(S): 90 TABLET ORAL at 06:36

## 2019-08-11 RX ADMIN — Medication 12.5 MILLIGRAM(S): at 17:00

## 2019-08-11 RX ADMIN — HEPARIN SODIUM 1200 UNIT(S)/HR: 5000 INJECTION INTRAVENOUS; SUBCUTANEOUS at 09:05

## 2019-08-11 RX ADMIN — HEPARIN SODIUM 1200 UNIT(S)/HR: 5000 INJECTION INTRAVENOUS; SUBCUTANEOUS at 01:20

## 2019-08-11 RX ADMIN — Medication 1 TABLET(S): at 09:06

## 2019-08-11 RX ADMIN — Medication 12.5 MILLIGRAM(S): at 06:36

## 2019-08-11 RX ADMIN — Medication 81 MILLIGRAM(S): at 09:06

## 2019-08-11 NOTE — PROGRESS NOTE ADULT - ASSESSMENT
70 yo male, no PMH, reported brother  of heart disease in his 40s, presents with intermittent chest pain since Tuesday morning, noted to have NSTEMI.  Currently asymptomatic.     RECS  - cont ASA and Brilinta, hep GTT, statin and rest of meds  - pending TTE   - needs a [facility]; will set up for tomorrow     A Garret JOHNSON  Cardiology Fellow  [phone]  All Cardiology service information can be found  on [email or web address], password: [email or web address]

## 2019-08-11 NOTE — PROVIDER CONTACT NOTE (OTHER) - ACTION/TREATMENT ORDERED:
As per NP will continue to monitor the pt. Not to hold the metoprolol as pt troponin levels are trending up.

## 2019-08-11 NOTE — PROGRESS NOTE ADULT - SUBJECTIVE AND OBJECTIVE BOX
- doing better this morning  - chest pain has resolved       Allergies    No Known Allergies    Intolerances    	    MEDICATIONS:  heparin  Infusion.  Unit(s)/Hr IV Continuous <Continuous>  heparin  Injectable 4000 Unit(s) IV Push every 6 hours PRN  ticagrelor 180 milliGRAM(s) Oral Once                  PAST MEDICAL & SURGICAL HISTORY: Denied      FAMILY HISTORY: Brother  of heart disease in his 40s      SOCIAL HISTORY:    Non-smoker, rare EtOH use        REVIEW OF SYSTEMS:  General: no fatigue/malaise, weight loss/gain.  Skin: no rashes.  Ophthalmologic: no blurred vision, no loss of vision. 	  ENT: no sore throat, rhinorrhea, sinus congestion.  Respiratory: no SOB, cough or wheeze.  Gastrointestinal:  no N/V/D, no melena/hematemesis/hematochezia.  Genitourinary: no dysuria/hesitancy or hematuria.  Musculoskeletal: no myalgias or arthralgias.  Neurological: no changes in vision or hearing, no lightheadedness/dizziness, no syncope/near syncope	  Psychiatric: no unusual stress/anxiety.   Hematology/Lymphatics: no unusual bleeding, bruising and no lymphadenopathy.  Endocrine: no unusual thirst.   All others negative except as stated above and in HPI.    PHYSICAL EXAM:  T(C): 36.7 (08-10-19 @ 03:21), Max: 36.7 (08-10-19 @ 03:21)  HR: 70 (08-10-19 @ 03:21) (70 - 90)  BP: 145/91 (08-10-19 @ 03:21) (145/91 - 173/99)  RR: 18 (08-10-19 @ 03:21) (18 - 18)  SpO2: 99% (08-10-19 @ 03:21) (99% - 99%)  Wt(kg): --  I&O's Summary      Appearance: No distress	  HEENT:   Normal oral mucosa, PERRL, EOMI	  Lymphatic: No lymphadenopathy  Cardiovascular: Normal S1 S2, No JVD, No murmurs, No edema  Respiratory: Lungs clear to auscultation	  Psychiatry: A & O x 3, Mood & affect appropriate  Gastrointestinal:  Soft, Non-tender, + BS	  Skin: No rashes, No ecchymoses, No cyanosis	  Neurologic: Non-focal  Extremities: Normal range of motion, No clubbing, cyanosis or edema  Vascular: Peripheral pulses palpable 2+ bilaterally        LABS:	 	    CBC Full  -  ( 10 Aug 2019 01:11 )  WBC Count : 9.4 K/uL  Hemoglobin : 15.2 g/dL  Hematocrit : 44.3 %  Platelet Count - Automated : 215 K/uL  Mean Cell Volume : 88.4 fl  Mean Cell Hemoglobin : 30.3 pg  Mean Cell Hemoglobin Concentration : 34.3 gm/dL  Auto Neutrophil # : 6.5 K/uL  Auto Lymphocyte # : 2.3 K/uL  Auto Monocyte # : 0.5 K/uL  Auto Eosinophil # : 0.1 K/uL  Auto Basophil # : 0.0 K/uL  Auto Neutrophil % : 68.5 %  Auto Lymphocyte % : 24.1 %  Auto Monocyte % : 5.8 %  Auto Eosinophil % : 1.4 %  Auto Basophil % : 0.1 %    08-10    139  |  101  |  15  ----------------------------<  125<H>  4.0   |  25  |  0.98    Ca    9.6      10 Aug 2019 01:11    TPro  7.6  /  Alb  4.6  /  TBili  0.4  /  DBili  x   /  AST  24  /  ALT  26  /  AlkPhos  67  08-10      proBNP:   Lipid Profile:   HgA1c:   TSH:       CARDIAC MARKERS:            TELEMETRY: 	    ECG:  	Sinus HR 84 T wave inversions II, III, aVF, V3-V6  RADIOLOGY:  OTHER: 	    PREVIOUS DIAGNOSTIC TESTING:    [ ] Echocardiogram:  [ ]  Catheterization:  [ ] Stress Test:

## 2019-08-11 NOTE — PROGRESS NOTE ADULT - SUBJECTIVE AND OBJECTIVE BOX
This patient is a 69yoM with PMH of gastric ulcers who presents to the ED with complaint of chest pain. This patient is from [place] and travels for work. 5 days prior to admission, the patient was in [place], [place] for work and had exercised at a gym. He used the elliptical machine. The following day, he experienced new onset of pain at the L axilla, which felt a pulled muscle. He attributed the pain to having moved his arms vigorously while on the elliptical. The pain improved with massage. He had no lightheadedness, palpitations, SOB, cough, or radiation of the pain. He had recurrent episodes of the pain every 8-10 hours for the following 4 days. The patient traveled to [place] to visit his grandchildren and was advised by his daughter in law to visit the urgent care center. At urgent care, he was provided with 4 aspirin. He was found to have an abnormal EKG and referred to the ED for further evaluation.  The patient has no history of MI. His brother  in bed unexpectedly at age 45 and the death was attributed to heart disease, however this was not verified. The patient denies history of tobacco use and states that he can readily climb 2 flights of stairs without CP, palpitations, dyspnea or lightheadedness.    Chest pain free. hemodynamically stable. cardiac cath in the am. Discussed with cardiology. Family updated.     REVIEW OF SYSTEMS:  General: NAD, hemodynamically stable, (-)  fever, (-) chills, (-) weakness  HEENT:  Eyes:  No visual loss, blurred vision, double vision or yellow sclerae. Ears, Nose, Throat:  No hearing loss, sneezing, congestion, runny nose or sore throat.  SKIN:  No rash or itching.  CARDIOVASCULAR:  No chest pain, chest pressure or chest discomfort. No palpitations or edema.  RESPIRATORY:  No shortness of breath, cough or sputum.  GASTROINTESTINAL:  No anorexia, nausea, vomiting or diarrhea. No abdominal pain or blood.  NEUROLOGICAL:  No headache, dizziness, syncope, paralysis, ataxia, numbness or tingling in the extremities. No change in bowel or bladder control.  MUSCULOSKELETAL:  No muscle, back pain, joint pain or stiffness.  HEMATOLOGIC:  No anemia, bleeding or bruising.  LYMPHATICS:  No enlarged nodes. No history of splenectomy.  ENDOCRINOLOGIC:  No reports of sweating, cold or heat intolerance. No polyuria or polydipsia.  ALLERGIES:  No history of asthma, hives, eczema or rhinitis.    Physical Exam:   GENERAL APPEARANCE:  NAD, hemodynamically stable  ICU Vital Signs Last 24 Hrs  T(C): 36.4 (11 Aug 2019 08:30), Max: 36.9 (10 Aug 2019 13:15)  T(F): 97.6 (11 Aug 2019 08:30), Max: 98.5 (10 Aug 2019 13:15)  HR: 68 (11 Aug 2019 08:30) (55 - 73)  BP: 130/81 (11 Aug 2019 08:30) (130/81 - 174/89)  BP(mean): --  ABP: --  ABP(mean): --  RR: 18 (11 Aug 2019 08:30) (16 - 18)  SpO2: 99% (11 Aug 2019 08:30) (98% - 99%)    HEENT:  Head is normocephalic    Skin:  Warm and dry without any rash   NECK:  Supple without lymphadenopathy.   HEART:  Regular rate and rhythm. normal S1 and S2, No M/R/G  LUNGS:  Good ins/exp effort, no W/R/R/C  ABDOMEN:  Soft, nontender, nondistended with good bowel sounds heard  EXTREMITIES:  Without cyanosis, clubbing or edema.   NEUROLOGICAL:  Gross nonfocal     Labs:     CBC Full  -  ( 11 Aug 2019 08:41 )  WBC Count : 8.69 K/uL  RBC Count : 4.98 M/uL  Hemoglobin : 14.7 g/dL  Hematocrit : 44.0 %  Platelet Count - Automated : 198 K/uL  Mean Cell Volume : 88.4 fl  Mean Cell Hemoglobin : 29.5 pg  Mean Cell Hemoglobin Concentration : 33.4 gm/dL  Auto Neutrophil # : x  Auto Lymphocyte # : x  Auto Monocyte # : x  Auto Eosinophil # : x  Auto Basophil # : x  Auto Neutrophil % : x  Auto Lymphocyte % : x  Auto Monocyte % : x  Auto Eosinophil % : x  Auto Basophil % : x    PT/INR - ( 10 Aug 2019 02:22 )   PT: 13.0 sec;   INR: 1.14 ratio         PTT - ( 11 Aug 2019 07:10 )  PTT:64.9 sec    0810    139  |  101  |  15  ----------------------------<  125<H>  4.0   |  25  |  0.98    Ca    9.6      10 Aug 2019 01:11    TPro  7.6  /  Alb  4.6  /  TBili  0.4  /  DBili  x   /  AST  24  /  ALT  26  /  AlkPhos  67  08-10       # NSTEMI  - Hemodynamically stable, chest pain has resolved  - ASA /  Brilinta / STATIN / BB  - heparin drip  - Start Lipitor 80  - Check TTE  - trend trops  - Will plan for angiogram on Monday.    # History of gastric ulcer  - witnessed epistaxis, gingival bleeding, melena, hematochezia, hematuria or other signs or symptoms of bleeding or bruising. Patient expressed understanding.   - Patient reported that he had a gastric ulcer in  related to prophylactic aspirin use, and thus he had stopped taking aspirin.  - Monitor PTT while patient is on heparin gtt.  - Low threshold to start PPI if patient develops any symptoms of gastritis or dyspepsia.

## 2019-08-11 NOTE — PROGRESS NOTE ADULT - ATTENDING COMMENTS
Patient seen and examined. Agree with assessment and plan as outlined above. NSTEMI PAOLO < 140. Symptom free on medical therapy. Plan for C tomorrow. If symptoms develop will do emergently.

## 2019-08-12 LAB
ANION GAP SERPL CALC-SCNC: 10 MMOL/L — SIGNIFICANT CHANGE UP (ref 5–17)
APTT BLD: 47.3 SEC — HIGH (ref 27.5–36.3)
BUN SERPL-MCNC: 13 MG/DL — SIGNIFICANT CHANGE UP (ref 7–23)
CALCIUM SERPL-MCNC: 9.6 MG/DL — SIGNIFICANT CHANGE UP (ref 8.4–10.5)
CHLORIDE SERPL-SCNC: 102 MMOL/L — SIGNIFICANT CHANGE UP (ref 96–108)
CO2 SERPL-SCNC: 25 MMOL/L — SIGNIFICANT CHANGE UP (ref 22–31)
CREAT SERPL-MCNC: 0.97 MG/DL — SIGNIFICANT CHANGE UP (ref 0.5–1.3)
GLUCOSE SERPL-MCNC: 99 MG/DL — SIGNIFICANT CHANGE UP (ref 70–99)
HCT VFR BLD CALC: 41.7 % — SIGNIFICANT CHANGE UP (ref 39–50)
HGB BLD-MCNC: 14.3 G/DL — SIGNIFICANT CHANGE UP (ref 13–17)
MCHC RBC-ENTMCNC: 29.9 PG — SIGNIFICANT CHANGE UP (ref 27–34)
MCHC RBC-ENTMCNC: 34.3 GM/DL — SIGNIFICANT CHANGE UP (ref 32–36)
MCV RBC AUTO: 87.2 FL — SIGNIFICANT CHANGE UP (ref 80–100)
PLATELET # BLD AUTO: 204 K/UL — SIGNIFICANT CHANGE UP (ref 150–400)
POTASSIUM SERPL-MCNC: 3.9 MMOL/L — SIGNIFICANT CHANGE UP (ref 3.5–5.3)
POTASSIUM SERPL-SCNC: 3.9 MMOL/L — SIGNIFICANT CHANGE UP (ref 3.5–5.3)
RBC # BLD: 4.78 M/UL — SIGNIFICANT CHANGE UP (ref 4.2–5.8)
RBC # FLD: 12 % — SIGNIFICANT CHANGE UP (ref 10.3–14.5)
SODIUM SERPL-SCNC: 137 MMOL/L — SIGNIFICANT CHANGE UP (ref 135–145)
WBC # BLD: 8.09 K/UL — SIGNIFICANT CHANGE UP (ref 3.8–10.5)
WBC # FLD AUTO: 8.09 K/UL — SIGNIFICANT CHANGE UP (ref 3.8–10.5)

## 2019-08-12 PROCEDURE — 99152 MOD SED SAME PHYS/QHP 5/>YRS: CPT

## 2019-08-12 PROCEDURE — 93458 L HRT ARTERY/VENTRICLE ANGIO: CPT | Mod: 26,59

## 2019-08-12 PROCEDURE — 99231 SBSQ HOSP IP/OBS SF/LOW 25: CPT | Mod: GC

## 2019-08-12 PROCEDURE — 99238 HOSP IP/OBS DSCHRG MGMT 30/<: CPT

## 2019-08-12 PROCEDURE — 99231 SBSQ HOSP IP/OBS SF/LOW 25: CPT

## 2019-08-12 PROCEDURE — 92941 PRQ TRLML REVSC TOT OCCL AMI: CPT | Mod: LD

## 2019-08-12 PROCEDURE — 99233 SBSQ HOSP IP/OBS HIGH 50: CPT | Mod: GC

## 2019-08-12 PROCEDURE — 99233 SBSQ HOSP IP/OBS HIGH 50: CPT

## 2019-08-12 PROCEDURE — 93010 ELECTROCARDIOGRAM REPORT: CPT

## 2019-08-12 PROCEDURE — 93306 TTE W/DOPPLER COMPLETE: CPT | Mod: 26

## 2019-08-12 RX ADMIN — TICAGRELOR 90 MILLIGRAM(S): 90 TABLET ORAL at 18:47

## 2019-08-12 RX ADMIN — TICAGRELOR 90 MILLIGRAM(S): 90 TABLET ORAL at 05:02

## 2019-08-12 RX ADMIN — Medication 12.5 MILLIGRAM(S): at 05:02

## 2019-08-12 RX ADMIN — HEPARIN SODIUM 1400 UNIT(S)/HR: 5000 INJECTION INTRAVENOUS; SUBCUTANEOUS at 12:22

## 2019-08-12 RX ADMIN — ATORVASTATIN CALCIUM 80 MILLIGRAM(S): 80 TABLET, FILM COATED ORAL at 21:28

## 2019-08-12 RX ADMIN — Medication 1 TABLET(S): at 12:22

## 2019-08-12 RX ADMIN — Medication 81 MILLIGRAM(S): at 12:22

## 2019-08-12 RX ADMIN — Medication 12.5 MILLIGRAM(S): at 18:47

## 2019-08-12 NOTE — PROGRESS NOTE ADULT - PROBLEM SELECTOR PLAN 1
cardiac enzymes peaked, T-wave inversions on EKG in setting of intermittent L sided chest pain  -c/w heparin ggt, brillanta, aspirin, statin, metoprolol 12.5mg bid  -plan for cardiac cath and TTE today, cards following  -monitor on tele

## 2019-08-12 NOTE — PROGRESS NOTE ADULT - ASSESSMENT
70 yo male, no PMH, reported brother  of heart disease in his 40s, presents with intermittent chest pain since Tuesday morning, noted to have NSTEMI.  Currently asymptomatic.     #Chest pain likely 2/2 NSTEMI:  - C/w DAPT with ASA and Brilinta  - C/w atorvastatin 80  - C/w metoprolol 12.5 q12  - Will plan for echo and LHC today.     Yesica Edwards MD  Cardiology Fellow - PGY 5  Text or Call: [phone]  For all New Consults and Questions:  [email or web address]   Login: [employer] 68 yo male, no PMH, reported brother  of heart disease in his 40s.  He presents with intermittent chest pain since Tuesday morning, noted to have NSTEMI.  Currently asymptomatic.       REC:  #1.  Chest pain likely 2/2 NSTEMI:  - C/w DAPT with ASA and Brilinta  - C/w atorvastatin 80  - C/w metoprolol 12.5 q12  - Will plan for echo and LHC today.       Yesica Edwards MD  Cardiology Fellow - PGY 5  Text or Call: [phone]  For all New Consults and Questions:  [email or web address]   Login: kalyan Braxton M.D.  Cardiology Attending, Consult Service  [phone]

## 2019-08-12 NOTE — PROGRESS NOTE ADULT - SUBJECTIVE AND OBJECTIVE BOX
Patient seen and examined at bedside.    Overnight Events: Pt with chest pain overnight.       REVIEW OF SYSTEMS:  Constitutional:     [ ] negative [ ] fevers [ ] chills [ ] weight loss [ ] weight gain  HEENT:                  [ ] negative [ ] dry eyes [ ] eye irritation [ ] postnasal drip [ ] nasal congestion  CV:                         [ ] negative  [ ] chest pain [ ] orthopnea [ ] palpitations [ ] murmur  Resp:                     [ ] negative [ ] cough [ ] shortness of breath [ ] dyspnea [ ] wheezing [ ] sputum [ ]hemoptysis  GI:                          [ ] negative [ ] nausea [ ] vomiting [ ] diarrhea [ ] constipation [ ] abd pain [ ] dysphagia   :                        [ ] negative [ ] dysuria [ ] nocturia [ ] hematuria [ ] increased urinary frequency  Musculoskeletal: [ ] negative [ ] back pain [ ] myalgias [ ] arthralgias [ ] fracture  Skin:                       [ ] negative [ ] rash [ ] itch  Neurological:        [ ] negative [ ] headache [ ] dizziness [ ] syncope [ ] weakness [ ] numbness  Psychiatric:           [ ] negative [ ] anxiety [ ] depression  Endocrine:            [ ] negative [ ] diabetes [ ] thyroid problem  Heme/Lymph:      [ ] negative [ ] anemia [ ] bleeding problem  Allergic/Immune: [ ] negative [ ] itchy eyes [ ] nasal discharge [ ] hives [ ] angioedema    [x] All other systems negative  [ ] Unable to assess ROS due to    Current Meds:  aspirin enteric coated 81 milliGRAM(s) Oral daily  atorvastatin 80 milliGRAM(s) Oral at bedtime  heparin  Infusion.  Unit(s)/Hr IV Continuous <Continuous>  heparin  Injectable 4000 Unit(s) IV Push every 6 hours PRN  metoprolol tartrate 12.5 milliGRAM(s) Oral every 12 hours  multivitamin 1 Tablet(s) Oral daily  ticagrelor 90 milliGRAM(s) Oral every 12 hours      PAST MEDICAL & SURGICAL HISTORY:  History of gastric ulcer: 2000  No significant past surgical history      Vitals:  T(F): 97.4 (08-12), Max: 98.4 (08-11)  HR: 63 (08-12) (58 - 77)  BP: 151/83 (08-12) (121/76 - 151/83)  RR: 17 (08-12)  SpO2: 97% (08-12)  I&O's Summary    11 Aug 2019 07:01  -  12 Aug 2019 07:00  --------------------------------------------------------  IN: 1528 mL / OUT: 0 mL / NET: 1528 mL        Physical Exam:  Appearance: No acute distress; well appearing  Eyes: PERRL, EOMI, pink conjunctiva  HENT: Normal oral mucosa  Cardiovascular: RRR, S1, S2, no murmurs, rubs, or gallops; no edema; no JVD  Respiratory: Clear to auscultation bilaterally  Gastrointestinal: soft, non-tender, non-distended with normal bowel sounds  Musculoskeletal: No clubbing; no joint deformity   Neurologic: Non-focal  Lymphatic: No lymphadenopathy  Psychiatry: AAOx3, mood & affect appropriate  Skin: No rashes, ecchymoses, or cyanosis                          14.3   8.09  )-----------( 204      ( 12 Aug 2019 08:16 )             41.7     08-12    137  |  102  |  13  ----------------------------<  99  3.9   |  25  |  0.97    Ca    9.6      12 Aug 2019 06:11      PTT - ( 12 Aug 2019 10:11 )  PTT:47.3 sec              New ECG(s): Personally reviewed        Interpretation of Telemetry: sinus 50 - 70 Patient seen and examined at bedside.    Overnight Events: Pt with chest pain overnight.       REVIEW OF SYSTEMS:  Constitutional:     [ ] negative [ ] fevers [ ] chills [ ] weight loss [ ] weight gain  HEENT:                  [ ] negative [ ] dry eyes [ ] eye irritation [ ] postnasal drip [ ] nasal congestion  CV:                         [ ] negative  [ ] chest pain [ ] orthopnea [ ] palpitations [ ] murmur  Resp:                     [ ] negative [ ] cough [ ] shortness of breath [ ] dyspnea [ ] wheezing [ ] sputum [ ]hemoptysis  GI:                          [ ] negative [ ] nausea [ ] vomiting [ ] diarrhea [ ] constipation [ ] abd pain [ ] dysphagia   :                        [ ] negative [ ] dysuria [ ] nocturia [ ] hematuria [ ] increased urinary frequency  Musculoskeletal: [ ] negative [ ] back pain [ ] myalgias [ ] arthralgias [ ] fracture  Skin:                       [ ] negative [ ] rash [ ] itch  Neurological:        [ ] negative [ ] headache [ ] dizziness [ ] syncope [ ] weakness [ ] numbness  Psychiatric:           [ ] negative [ ] anxiety [ ] depression  Endocrine:            [ ] negative [ ] diabetes [ ] thyroid problem  Heme/Lymph:      [ ] negative [ ] anemia [ ] bleeding problem  Allergic/Immune: [ ] negative [ ] itchy eyes [ ] nasal discharge [ ] hives [ ] angioedema  [x] All other systems negative      Current Meds:  aspirin enteric coated 81 milliGRAM(s) Oral daily  atorvastatin 80 milliGRAM(s) Oral at bedtime  heparin  Infusion.  Unit(s)/Hr IV Continuous <Continuous>  heparin  Injectable 4000 Unit(s) IV Push every 6 hours PRN  metoprolol tartrate 12.5 milliGRAM(s) Oral every 12 hours  multivitamin 1 Tablet(s) Oral daily  ticagrelor 90 milliGRAM(s) Oral every 12 hours      PAST MEDICAL & SURGICAL HISTORY:  History of gastric ulcer: 2000  No significant past surgical history      Vitals:  T(F): 97.4 (08-12), Max: 98.4 (08-11)  HR: 63 (08-12) (58 - 77)  BP: 151/83 (08-12) (121/76 - 151/83)  RR: 17 (08-12)  SpO2: 97% (08-12)    Physical Exam:  Appearance: No acute distress; well appearing  Eyes: PERRL, EOMI, pink conjunctiva  HENT: Normal oral mucosa  Cardiovascular: RRR, S1, S2, no murmurs, rubs, or gallops; no edema; no JVD  Respiratory: Clear to auscultation bilaterally  Gastrointestinal: soft, non-tender, non-distended with normal bowel sounds  Musculoskeletal: No clubbing; no joint deformity   Neurologic: Non-focal  Lymphatic: No lymphadenopathy  Psychiatry: AAOx3, mood & affect appropriate  Skin: No rashes, ecchymoses, or cyanosis               LABS:             14.3   8.09  )-----------( 204      ( 12 Aug 2019 08:16 )             41.7     08-12  137  |  102  |  13  ----------------------------<  99  3.9   |  25  |  0.97    Ca    9.6      12 Aug 2019 06:11    PTT - ( 12 Aug 2019 10:11 )  PTT:47.3 sec      Interpretation of Telemetry: sinus 50 - 70

## 2019-08-12 NOTE — PROGRESS NOTE ADULT - ASSESSMENT
This patient is a 69yoM with PMH of gastric ulcers who presents to the ED with complaint of chest pain, found to have elevated troponins and TWI on EKG consistent with NSTEMI for cath today.

## 2019-08-12 NOTE — PROGRESS NOTE ADULT - SUBJECTIVE AND OBJECTIVE BOX
Patient is a 69y old  Male who presents with a chief complaint of chest pain (11 Aug 2019 11:01)      SUBJECTIVE / OVERNIGHT EVENTS: No overnight events. Denies cp but says he has chest pressure this morning because he is anxious about getting cath. Denies, sob, palpitations, n/v, lightheadedness.    tele reviewed: 50-80, pvcs, bigeminy     MEDICATIONS  (STANDING):  aspirin enteric coated 81 milliGRAM(s) Oral daily  atorvastatin 80 milliGRAM(s) Oral at bedtime  heparin  Infusion.  Unit(s)/Hr (10 mL/Hr) IV Continuous <Continuous>  metoprolol tartrate 12.5 milliGRAM(s) Oral every 12 hours  multivitamin 1 Tablet(s) Oral daily  ticagrelor 90 milliGRAM(s) Oral every 12 hours    MEDICATIONS  (PRN):  heparin  Injectable 4000 Unit(s) IV Push every 6 hours PRN For aPTT less than 40      Vital Signs Last 24 Hrs  T(C): 36.8 (12 Aug 2019 12:04), Max: 36.9 (11 Aug 2019 20:24)  T(F): 98.2 (12 Aug 2019 12:04), Max: 98.4 (11 Aug 2019 20:24)  HR: 63 (12 Aug 2019 08:18) (58 - 77)  BP: 124/69 (12 Aug 2019 12:04) (121/76 - 151/83)  BP(mean): --  RR: 18 (12 Aug 2019 12:04) (16 - 18)  SpO2: 98% (12 Aug 2019 12:04) (96% - 100%)  CAPILLARY BLOOD GLUCOSE        I&O's Summary    11 Aug 2019 07:01  -  12 Aug 2019 07:00  --------------------------------------------------------  IN: 1528 mL / OUT: 0 mL / NET: 1528 mL        PHYSICAL EXAM:  GENERAL: NAD, well-developed  HEAD:  Atraumatic, Normocephalic  NECK: Supple, No JVD  CHEST/LUNG: Clear to auscultation bilaterally; No wheeze  HEART: Regular rate and rhythm; No murmurs, rubs, or gallops  ABDOMEN: Soft, Nontender, Nondistended; Bowel sounds present  EXTREMITIES:  2+ Peripheral Pulses, No clubbing, cyanosis, or edema  PSYCH: AAOx3  NEUROLOGY: non-focal    LABS:                        14.3   8.09  )-----------( 204      ( 12 Aug 2019 08:16 )             41.7     08-12    137  |  102  |  13  ----------------------------<  99  3.9   |  25  |  0.97    Ca    9.6      12 Aug 2019 06:11      PTT - ( 12 Aug 2019 10:11 )  PTT:47.3 sec              RADIOLOGY & ADDITIONAL TESTS:    Imaging Personally Reviewed:    Consultant(s) Notes Reviewed:  cards    Care Discussed with Consultants/Other Providers:

## 2019-08-13 VITALS
TEMPERATURE: 98 F | SYSTOLIC BLOOD PRESSURE: 128 MMHG | HEART RATE: 67 BPM | DIASTOLIC BLOOD PRESSURE: 79 MMHG | OXYGEN SATURATION: 98 % | RESPIRATION RATE: 18 BRPM

## 2019-08-13 LAB
ANION GAP SERPL CALC-SCNC: 15 MMOL/L — SIGNIFICANT CHANGE UP (ref 5–17)
BUN SERPL-MCNC: 15 MG/DL — SIGNIFICANT CHANGE UP (ref 7–23)
CALCIUM SERPL-MCNC: 9.6 MG/DL — SIGNIFICANT CHANGE UP (ref 8.4–10.5)
CHLORIDE SERPL-SCNC: 102 MMOL/L — SIGNIFICANT CHANGE UP (ref 96–108)
CO2 SERPL-SCNC: 25 MMOL/L — SIGNIFICANT CHANGE UP (ref 22–31)
CREAT SERPL-MCNC: 0.92 MG/DL — SIGNIFICANT CHANGE UP (ref 0.5–1.3)
GLUCOSE SERPL-MCNC: 95 MG/DL — SIGNIFICANT CHANGE UP (ref 70–99)
HCT VFR BLD CALC: 43.5 % — SIGNIFICANT CHANGE UP (ref 39–50)
HGB BLD-MCNC: 14.4 G/DL — SIGNIFICANT CHANGE UP (ref 13–17)
MCHC RBC-ENTMCNC: 29 PG — SIGNIFICANT CHANGE UP (ref 27–34)
MCHC RBC-ENTMCNC: 33.1 GM/DL — SIGNIFICANT CHANGE UP (ref 32–36)
MCV RBC AUTO: 87.5 FL — SIGNIFICANT CHANGE UP (ref 80–100)
PLATELET # BLD AUTO: 207 K/UL — SIGNIFICANT CHANGE UP (ref 150–400)
POTASSIUM SERPL-MCNC: 3.9 MMOL/L — SIGNIFICANT CHANGE UP (ref 3.5–5.3)
POTASSIUM SERPL-SCNC: 3.9 MMOL/L — SIGNIFICANT CHANGE UP (ref 3.5–5.3)
RBC # BLD: 4.97 M/UL — SIGNIFICANT CHANGE UP (ref 4.2–5.8)
RBC # FLD: 12 % — SIGNIFICANT CHANGE UP (ref 10.3–14.5)
SODIUM SERPL-SCNC: 142 MMOL/L — SIGNIFICANT CHANGE UP (ref 135–145)
WBC # BLD: 9.14 K/UL — SIGNIFICANT CHANGE UP (ref 3.8–10.5)
WBC # FLD AUTO: 9.14 K/UL — SIGNIFICANT CHANGE UP (ref 3.8–10.5)

## 2019-08-13 PROCEDURE — 99238 HOSP IP/OBS DSCHRG MGMT 30/<: CPT

## 2019-08-13 PROCEDURE — 85730 THROMBOPLASTIN TIME PARTIAL: CPT

## 2019-08-13 PROCEDURE — C1874: CPT

## 2019-08-13 PROCEDURE — 93010 ELECTROCARDIOGRAM REPORT: CPT

## 2019-08-13 PROCEDURE — 99231 SBSQ HOSP IP/OBS SF/LOW 25: CPT | Mod: GC

## 2019-08-13 PROCEDURE — C1894: CPT

## 2019-08-13 PROCEDURE — C1725: CPT

## 2019-08-13 PROCEDURE — 93306 TTE W/DOPPLER COMPLETE: CPT

## 2019-08-13 PROCEDURE — 99285 EMERGENCY DEPT VISIT HI MDM: CPT | Mod: 25

## 2019-08-13 PROCEDURE — 99152 MOD SED SAME PHYS/QHP 5/>YRS: CPT

## 2019-08-13 PROCEDURE — 85610 PROTHROMBIN TIME: CPT

## 2019-08-13 PROCEDURE — 99153 MOD SED SAME PHYS/QHP EA: CPT

## 2019-08-13 PROCEDURE — 96374 THER/PROPH/DIAG INJ IV PUSH: CPT

## 2019-08-13 PROCEDURE — 80053 COMPREHEN METABOLIC PANEL: CPT

## 2019-08-13 PROCEDURE — 71046 X-RAY EXAM CHEST 2 VIEWS: CPT

## 2019-08-13 PROCEDURE — 80061 LIPID PANEL: CPT

## 2019-08-13 PROCEDURE — 83036 HEMOGLOBIN GLYCOSYLATED A1C: CPT

## 2019-08-13 PROCEDURE — 84484 ASSAY OF TROPONIN QUANT: CPT

## 2019-08-13 PROCEDURE — 82550 ASSAY OF CK (CPK): CPT

## 2019-08-13 PROCEDURE — C1769: CPT

## 2019-08-13 PROCEDURE — C1887: CPT

## 2019-08-13 PROCEDURE — 80048 BASIC METABOLIC PNL TOTAL CA: CPT

## 2019-08-13 PROCEDURE — 99232 SBSQ HOSP IP/OBS MODERATE 35: CPT | Mod: GC

## 2019-08-13 PROCEDURE — C9606: CPT | Mod: LD

## 2019-08-13 PROCEDURE — 86803 HEPATITIS C AB TEST: CPT

## 2019-08-13 PROCEDURE — 93005 ELECTROCARDIOGRAM TRACING: CPT

## 2019-08-13 PROCEDURE — 85027 COMPLETE CBC AUTOMATED: CPT

## 2019-08-13 PROCEDURE — 82553 CREATINE MB FRACTION: CPT

## 2019-08-13 PROCEDURE — 93458 L HRT ARTERY/VENTRICLE ANGIO: CPT | Mod: 59

## 2019-08-13 PROCEDURE — 99239 HOSP IP/OBS DSCHRG MGMT >30: CPT

## 2019-08-13 RX ORDER — LISINOPRIL 2.5 MG/1
1 TABLET ORAL
Qty: 30 | Refills: 0
Start: 2019-08-13 | End: 2019-09-11

## 2019-08-13 RX ORDER — ASPIRIN/CALCIUM CARB/MAGNESIUM 324 MG
1 TABLET ORAL
Qty: 30 | Refills: 0
Start: 2019-08-13 | End: 2019-09-11

## 2019-08-13 RX ORDER — ATORVASTATIN CALCIUM 80 MG/1
1 TABLET, FILM COATED ORAL
Qty: 30 | Refills: 0
Start: 2019-08-13 | End: 2019-09-11

## 2019-08-13 RX ORDER — TICAGRELOR 90 MG/1
1 TABLET ORAL
Qty: 60 | Refills: 0
Start: 2019-08-13 | End: 2019-09-11

## 2019-08-13 RX ORDER — MILK THISTLE 150 MG
1 CAPSULE ORAL
Qty: 0 | Refills: 0 | DISCHARGE

## 2019-08-13 RX ORDER — LISINOPRIL 2.5 MG/1
2.5 TABLET ORAL DAILY
Refills: 0 | Status: DISCONTINUED | OUTPATIENT
Start: 2019-08-13 | End: 2019-08-13

## 2019-08-13 RX ORDER — METOPROLOL TARTRATE 50 MG
25 TABLET ORAL EVERY 24 HOURS
Refills: 0 | Status: DISCONTINUED | OUTPATIENT
Start: 2019-08-13 | End: 2019-08-13

## 2019-08-13 RX ORDER — UBIDECARENONE 100 MG
1 CAPSULE ORAL
Qty: 0 | Refills: 0 | DISCHARGE

## 2019-08-13 RX ORDER — METOPROLOL TARTRATE 50 MG
1 TABLET ORAL
Qty: 30 | Refills: 0
Start: 2019-08-13 | End: 2019-09-11

## 2019-08-13 RX ORDER — GARLIC 1000 MG
1 CAPSULE ORAL
Qty: 0 | Refills: 0 | DISCHARGE

## 2019-08-13 RX ORDER — OMEGA-3 ACID ETHYL ESTERS 1 G
1 CAPSULE ORAL
Qty: 0 | Refills: 0 | DISCHARGE

## 2019-08-13 RX ADMIN — Medication 1 TABLET(S): at 12:09

## 2019-08-13 RX ADMIN — Medication 12.5 MILLIGRAM(S): at 06:16

## 2019-08-13 RX ADMIN — TICAGRELOR 90 MILLIGRAM(S): 90 TABLET ORAL at 06:16

## 2019-08-13 RX ADMIN — LISINOPRIL 2.5 MILLIGRAM(S): 2.5 TABLET ORAL at 12:09

## 2019-08-13 RX ADMIN — Medication 81 MILLIGRAM(S): at 12:09

## 2019-08-13 NOTE — PROGRESS NOTE ADULT - SUBJECTIVE AND OBJECTIVE BOX
Patient seen and examined at bedside.    Overnight Events: No acute events overnight.       REVIEW OF SYSTEMS:  Constitutional:     [ ] negative [ ] fevers [ ] chills [ ] weight loss [ ] weight gain  HEENT:                  [ ] negative [ ] dry eyes [ ] eye irritation [ ] postnasal drip [ ] nasal congestion  CV:                         [ ] negative  [ ] chest pain [ ] orthopnea [ ] palpitations [ ] murmur  Resp:                     [ ] negative [ ] cough [ ] shortness of breath [ ] dyspnea [ ] wheezing [ ] sputum [ ]hemoptysis  GI:                          [ ] negative [ ] nausea [ ] vomiting [ ] diarrhea [ ] constipation [ ] abd pain [ ] dysphagia   :                        [ ] negative [ ] dysuria [ ] nocturia [ ] hematuria [ ] increased urinary frequency  Musculoskeletal: [ ] negative [ ] back pain [ ] myalgias [ ] arthralgias [ ] fracture  Skin:                       [ ] negative [ ] rash [ ] itch  Neurological:        [ ] negative [ ] headache [ ] dizziness [ ] syncope [ ] weakness [ ] numbness  Psychiatric:           [ ] negative [ ] anxiety [ ] depression  Endocrine:            [ ] negative [ ] diabetes [ ] thyroid problem  Heme/Lymph:      [ ] negative [ ] anemia [ ] bleeding problem  Allergic/Immune: [ ] negative [ ] itchy eyes [ ] nasal discharge [ ] hives [ ] angioedema    [ x] All other systems negative  [ ] Unable to assess ROS due to    Current Meds:  aspirin enteric coated 81 milliGRAM(s) Oral daily  atorvastatin 80 milliGRAM(s) Oral at bedtime  metoprolol tartrate 12.5 milliGRAM(s) Oral every 12 hours  multivitamin 1 Tablet(s) Oral daily  ticagrelor 90 milliGRAM(s) Oral every 12 hours      PAST MEDICAL & SURGICAL HISTORY:  History of gastric ulcer: 2000  No significant past surgical history      Vitals:  T(F): 98.1 (08-13), Max: 98.2 (08-12)  HR: 61 (08-13) (51 - 69)  BP: 133/71 (08-13) (124/69 - 161/80)  RR: 18 (08-13)  SpO2: 98% (08-13)  I&O's Summary    12 Aug 2019 07:01  -  13 Aug 2019 07:00  --------------------------------------------------------  IN: 690 mL / OUT: 0 mL / NET: 690 mL        Physical Exam:  Appearance: No acute distress; well appearing  Eyes: PERRL, EOMI, pink conjunctiva  HENT: Normal oral mucosa  Cardiovascular: RRR, S1, S2, no murmurs, rubs, or gallops; no edema; no JVD  Respiratory: Clear to auscultation bilaterally  Gastrointestinal: soft, non-tender, non-distended with normal bowel sounds  Musculoskeletal: No clubbing; no joint deformity   Neurologic: Non-focal  Lymphatic: No lymphadenopathy  Psychiatry: AAOx3, mood & affect appropriate  Skin: No rashes, ecchymoses, or cyanosis                          14.4   9.14  )-----------( 207      ( 13 Aug 2019 08:48 )             43.5     08-13    142  |  102  |  15  ----------------------------<  95  3.9   |  25  |  0.92    Ca    9.6      13 Aug 2019 07:12      PTT - ( 12 Aug 2019 10:11 )  PTT:47.3 sec              New ECG(s): Personally reviewed    Echo:  < from: Transthoracic Echocardiogram (08.12.19 @ 10:26) >  1. Normal left ventricular internal dimensions and wall  thicknesses.  2. Normal left ventricular systolic function.    < end of copied text >      Cath:  < from: Cardiac Cath Lab - Adult (08.12.19 @ 14:25) >  LAD:   --  Mid LAD: There was a 90 % stenosis.  CX:   --  OM2: There was a50 % stenosis.  RCA:   --  RCA: Angiography showed minor luminal irregularities with no  flow limiting lesions.  COMPLICATIONS: There were no complications.  DIAGNOSTIC RECOMMENDATIONS: ASA and Plavix for 1 year.  INTERVENTIONAL RECOMMENDATIONS: ASA and Plavix for 1 year.    < end of copied text > Patient seen and examined at bedside.    Overnight Events: No acute events overnight.       REVIEW OF SYSTEMS:  Constitutional:     [ ] negative [ ] fevers [ ] chills [ ] weight loss [ ] weight gain  HEENT:                  [ ] negative [ ] dry eyes [ ] eye irritation [ ] postnasal drip [ ] nasal congestion  CV:                         [ ] negative  [ ] chest pain [ ] orthopnea [ ] palpitations [ ] murmur  Resp:                     [ ] negative [ ] cough [ ] shortness of breath [ ] dyspnea [ ] wheezing [ ] sputum [ ]hemoptysis  GI:                          [ ] negative [ ] nausea [ ] vomiting [ ] diarrhea [ ] constipation [ ] abd pain [ ] dysphagia   :                        [ ] negative [ ] dysuria [ ] nocturia [ ] hematuria [ ] increased urinary frequency  Musculoskeletal: [ ] negative [ ] back pain [ ] myalgias [ ] arthralgias [ ] fracture  Skin:                       [ ] negative [ ] rash [ ] itch  Neurological:        [ ] negative [ ] headache [ ] dizziness [ ] syncope [ ] weakness [ ] numbness  Psychiatric:           [ ] negative [ ] anxiety [ ] depression  Endocrine:            [ ] negative [ ] diabetes [ ] thyroid problem  Heme/Lymph:      [ ] negative [ ] anemia [ ] bleeding problem  Allergic/Immune: [ ] negative [ ] itchy eyes [ ] nasal discharge [ ] hives [ ] angioedema    [ x] All other systems negative  [ ] Unable to assess ROS due to    Current Meds:  aspirin enteric coated 81 milliGRAM(s) Oral daily  atorvastatin 80 milliGRAM(s) Oral at bedtime  metoprolol tartrate 12.5 milliGRAM(s) Oral every 12 hours  multivitamin 1 Tablet(s) Oral daily  ticagrelor 90 milliGRAM(s) Oral every 12 hours      PAST MEDICAL & SURGICAL HISTORY:  History of gastric ulcer: 2000  No significant past surgical history      Vitals:  T(F): 98.1 (08-13), Max: 98.2 (08-12)  HR: 61 (08-13) (51 - 69)  BP: 133/71 (08-13) (124/69 - 161/80)  RR: 18 (08-13)  SpO2: 98% (08-13)      Physical Exam:  Appearance: No acute distress; well appearing  Eyes: PERRL, EOMI, pink conjunctiva  HENT: Normal oral mucosa  Cardiovascular: RRR, S1, S2, no murmurs, rubs, or gallops; no edema; no JVD  Respiratory: Clear to auscultation bilaterally  Gastrointestinal: soft, non-tender, non-distended with normal bowel sounds  Musculoskeletal: No clubbing; no joint deformity   Neurologic: Non-focal  Lymphatic: No lymphadenopathy  Psychiatry: AAOx3, mood & affect appropriate  Skin: No rashes, ecchymoses, or cyanosis      LABS:                      14.4   9.14  )-----------( 207      ( 13 Aug 2019 08:48 )             43.5     08-13  142  |  102  |  15  ----------------------------<  95  3.9   |  25  |  0.92    Ca    9.6      13 Aug 2019 07:12    PTT - ( 12 Aug 2019 10:11 )  PTT:47.3 sec      Transthoracic Echocardiogram (08.12.19 @ 10:26) >  1. Normal left ventricular internal dimensions and wall thicknesses.  2. Normal left ventricular systolic function.      Cardiac Cath Lab - Adult (08.12.19 @ 14:25) >  LAD:   --  Mid LAD: There was a 90 % stenosis.  CX:   --  OM2: There was a50 % stenosis.  RCA:   --  RCA: Angiography showed minor luminal irregularities with no  flow limiting lesions.  COMPLICATIONS: There were no complications.  DIAGNOSTIC RECOMMENDATIONS: ASA and Plavix for 1 year.  INTERVENTIONAL RECOMMENDATIONS: ASA and Plavix for 1 year.

## 2019-08-13 NOTE — PROGRESS NOTE ADULT - SUBJECTIVE AND OBJECTIVE BOX
Cardiology Progress Note    Interval: Pt resting comfortably in bed. Noted feeling well with no chest pain.    Tele: Sinus rhtyhm    HPI:  This patient is a 69yoM with PMH of gastric ulcers who presents to the ED with complaint of chest pain. This patient is from [place] and travels for work. 5 days prior to admission, the patient was in [place], [place] for work and had exercised at a gym. He used the elliptical machine. The following day, he experienced new onset of pain at the L axilla, which felt a pulled muscle. He attributed the pain to having moved his arms vigorously while on the elliptical. The pain improved with massage. He had no lightheadedness, palpitations, SOB, cough, or radiation of the pain. He had recurrent episodes of the pain every 8-10 hours for the following 4 days. The patient traveled to [place] to visit his grandchildren and was advised by his daughter in law to visit the urgent care center. At urgent care, he was provided with 4 aspirin. He was found to have an abnormal EKG and referred to the ED for further evaluation.  The patient has no history of MI. His brother  in bed unexpectedly at age 45 and the death was attributed to heart disease, however this was not verified. The patient denies history of tobacco use and states that he can readily climb 2 flights of stairs without CP, palpitations, dyspnea or lightheadedness.    In the ED, T  , HR 86, /99, RR 18, SpO2 99%RA (10 Aug 2019 06:10)      Medications:  aspirin enteric coated 81 milliGRAM(s) Oral daily  atorvastatin 80 milliGRAM(s) Oral at bedtime  metoprolol tartrate 12.5 milliGRAM(s) Oral every 12 hours  multivitamin 1 Tablet(s) Oral daily  ticagrelor 90 milliGRAM(s) Oral every 12 hours      Review of Systems:  Constitutional: [ ] Fever [ ] Chills [ ] Fatigue [ ] Weight change   HEENT: [ ] Blurred vision [ ] Eye Pain [ ] Headache [ ] Runny nose [ ] Sore Throat   Respiratory: [ ] Cough [ ] Wheezing [ ] Shortness of breath  Cardiovascular: [ ] Chest Pain [ ] Palpitations [ ] ALVARADO [ ] PND [ ] Orthopnea  Gastrointestinal: [ ] Abdominal Pain [ ] Diarrhea [ ] Constipation [ ] Hemorrhoids [ ] Nausea [ ] Vomiting  Genitourinary: [ ] Nocturia [ ] Dysuria [ ] Incontinence  Extremities: [ ] Swelling [ ] Joint Pain  Neurologic: [ ] Focal deficit [ ] Paresthesias [ ] Syncope  Lymphatic: [ ] Swelling [ ] Lymphadenopathy   Skin: [ ] Rash [ ] Ecchymoses [ ] Wounds [ ] Lesions  Psychiatry: [ ] Depression [ ] Suicidal/Homicidal Ideation [ ] Anxiety [ ] Sleep Disturbances  [ ] 10 point review of systems is otherwise negative except as mentioned above            [ ]Unable to obtain    Vitals:  T(C): 36.7 (19 @ 08:00), Max: 36.8 (19 @ 12:04)  HR: 61 (19 @ 08:00) (51 - 69)  BP: 133/71 (19 @ 08:00) (124/69 - 161/80)  BP(mean): --  RR: 18 (19 @ 08:00) (18 - 18)  SpO2: 98% (19 @ 08:00) (97% - 99%)  Wt(kg): --  Daily     Daily   I&O's Summary    12 Aug 2019 07:01  -  13 Aug 2019 07:00  --------------------------------------------------------  IN: 690 mL / OUT: 0 mL / NET: 690 mL        Physical Exam:  General: NAD  Eye: PERRL, EOMI  HENT: Normal oral mucosa NC/AT  CV: Normal S1/S2, RRR, No M/R/G, no edema, no elevation in JVP, R radial pulse intact  Resp: Normal respiratory effort, clear to auscultation bilaterally, no wheezing, no crackles  Abd: Soft, Non-tender, Non-distended, BS+  Ext: No clubbing, No joint deformity   Neuro: Non-focal, motor and sensory intact  Lymph: No lymphadenopathy  Psych: AAOx3, Mood & affect appropriate  Skin: No rashes, No ecchymoses, No cyanosis    Labs:                        14.4   9.14  )-----------( 207      ( 13 Aug 2019 08:48 )             43.5     08-13    142  |  102  |  15  ----------------------------<  95  3.9   |  25  |  0.92    Ca    9.6      13 Aug 2019 07:12      PTT - ( 12 Aug 2019 10:11 )  PTT:47.3 sec              New results/imaging:

## 2019-08-13 NOTE — DISCHARGE NOTE PROVIDER - PROVIDER TOKENS
FREE:[LAST:[[place]],FIRST:[Dr Head],PHONE:[(   )    -],FAX:[(   )    -]] FREE:[LAST:[[place]anna],FIRST:[Dr Head],PHONE:[(   )    -],FAX:[(   )    -]],FREE:[LAST:[Jerry],FIRST:[Dr Amato],PHONE:[(   )    -],FAX:[(   )    -],ADDRESS:[cardiologist]]

## 2019-08-13 NOTE — PROGRESS NOTE ADULT - NSHPATTENDINGPLANDISCUSS_GEN_ALL_CORE
Plan discussed with cardiology fellow, Dr. Edwards; patient seen and examined.       I was physically present for the key portions of the evaluation and management (E/M) service provided.    I agree with the above history, physical, and plan which I have reviewed and edited where appropriate.
Plan discussed with cardiology fellow, Dr. Edwards; patient seen and examined.       I was physically present for the key portions of the evaluation and management (E/M) service provided.    I agree with the above history, physical, and plan which I have reviewed and edited where appropriate.
JOCE Landa
NP Denise and cards. son over phone

## 2019-08-13 NOTE — DISCHARGE NOTE PROVIDER - CARE PROVIDER_API CALL
Dr Sonido Corrales  Phone: (   )    -  Fax: (   )    -  Follow Up Time: Dr Sonido Corrales  Phone: (   )    -  Fax: (   )    -  Follow Up Time:     Dr Lm Edwards  cardiologist  Phone: (   )    -  Fax: (   )    -  Follow Up Time:

## 2019-08-13 NOTE — PROGRESS NOTE ADULT - SUBJECTIVE AND OBJECTIVE BOX
Patient is a 69y old  Male who presents with a chief complaint of chest pain (13 Aug 2019 11:00)      SUBJECTIVE / OVERNIGHT EVENTS: No overnight events. s/p cath with DANNY to mid LAD yesterday. Feels well, denies chest pain or sob at rest or exertion. No pain at R arm access site. Denies lightheadedness, dizziness, n/v.    Tele revieweD: sinus 40 during sleeping, increases to 90 with ambulation, asymptomatic     MEDICATIONS  (STANDING):  aspirin enteric coated 81 milliGRAM(s) Oral daily  atorvastatin 80 milliGRAM(s) Oral at bedtime  lisinopril 2.5 milliGRAM(s) Oral daily  metoprolol succinate ER 25 milliGRAM(s) Oral every 24 hours  multivitamin 1 Tablet(s) Oral daily  ticagrelor 90 milliGRAM(s) Oral every 12 hours    MEDICATIONS  (PRN):      Vital Signs Last 24 Hrs  T(C): 36.7 (13 Aug 2019 08:00), Max: 36.8 (12 Aug 2019 12:04)  T(F): 98.1 (13 Aug 2019 08:00), Max: 98.2 (12 Aug 2019 12:04)  HR: 61 (13 Aug 2019 08:00) (51 - 69)  BP: 133/71 (13 Aug 2019 08:00) (124/69 - 161/80)  BP(mean): --  RR: 18 (13 Aug 2019 08:00) (18 - 18)  SpO2: 98% (13 Aug 2019 08:00) (97% - 99%)  CAPILLARY BLOOD GLUCOSE        I&O's Summary    12 Aug 2019 07:01  -  13 Aug 2019 07:00  --------------------------------------------------------  IN: 690 mL / OUT: 0 mL / NET: 690 mL        PHYSICAL EXAM:  GENERAL: NAD, well-developed  HEAD:  Atraumatic, Normocephalic  NECK: Supple, No JVD  CHEST/LUNG: Clear to auscultation bilaterally; No wheeze  HEART: Regular rate and rhythm; No murmurs, rubs, or gallops  ABDOMEN: Soft, Nontender, Nondistended; Bowel sounds present  EXTREMITIES:  2+ Peripheral Pulses, No clubbing, cyanosis, or edema. R radial site with good pulse, no bleeding or swelling  PSYCH: AAOx3  NEUROLOGY: non-focal      LABS:                        14.4   9.14  )-----------( 207      ( 13 Aug 2019 08:48 )             43.5     08-13    142  |  102  |  15  ----------------------------<  95  3.9   |  25  |  0.92    Ca    9.6      13 Aug 2019 07:12      PTT - ( 12 Aug 2019 10:11 )  PTT:47.3 sec              RADIOLOGY & ADDITIONAL TESTS:    Imaging Personally Reviewed:    Consultant(s) Notes Reviewed:  all    Care Discussed with Consultants/Other Providers: cards

## 2019-08-13 NOTE — PROGRESS NOTE ADULT - ASSESSMENT
A/P: 69 year old M pt with no known PMH p/w chest pain.    - clinically stable with no chest pain  - ekg and telemetry reviewed, stable sinus rhythm  - labs reviewed, stable hg and cr  - c/w dapt (asp, brilinta), statin, lopressor  - start lisinopril 2.5mg daily  - cleared for discharge with outpt follow-up in [place]    Marciano Sanchez, #[phone]  Cardiology Fellow

## 2019-08-13 NOTE — PROGRESS NOTE ADULT - PROBLEM SELECTOR PLAN 3
VTE ppx: on heparin gtt  Dipso: discharge home today with outpatient cards in [place] Dr Lm Edwards within 1 -2 weeks and PCP Dr. Corrales  spent 42 min on discharge process time

## 2019-08-13 NOTE — PROGRESS NOTE ADULT - ASSESSMENT
This patient is a 69yoM with PMH of gastric ulcers who presents to the ED with complaint of chest pain, found to have elevated troponins and TWI on EKG consistent with NSTEMI s/p DANNY to mid LAD

## 2019-08-13 NOTE — DISCHARGE NOTE PROVIDER - NSDCCPCAREPLAN_GEN_ALL_CORE_FT
PRINCIPAL DISCHARGE DIAGNOSIS  Diagnosis: Non-ST elevation MI (NSTEMI)  Assessment and Plan of Treatment:   Call your doctor if you have unusual chest pain, pressure, or discomfort, shortness of breath, nausea, vomiting, burping, heartburn, tingling upper body parts, sweating, cold, clammy sking, racing heartbeat  Call 911 if you think you are having a heart attack  Take all cardiac medications as prescribed - notify your doctor if you have any side effects  Follow cardiac diet - avoid fatty & fried foods, don't eat too much red meat, eat lots of fruits & vegetables, dairy products should be low fat  Lose weight if you are overweight. PRINCIPAL DISCHARGE DIAGNOSIS  Diagnosis: Non-ST elevation MI (NSTEMI)  Assessment and Plan of Treatment: Follow up with your cardiologist in one week, call to make an appointment.  Call your doctor if you have unusual chest pain, pressure, or discomfort, shortness of breath, nausea, vomiting, burping, heartburn, tingling upper body parts, sweating, cold, clammy sking, racing heartbeat  Call 911 if you think you are having a heart attack  Take all cardiac medications as prescribed - notify your doctor if you have any side effects  Follow cardiac diet - avoid fatty & fried foods, don't eat too much red meat, eat lots of fruits & vegetables, dairy products should be low fat  Lose weight if you are overweight. PRINCIPAL DISCHARGE DIAGNOSIS  Diagnosis: Non-ST elevation MI (NSTEMI)  Assessment and Plan of Treatment: Follow up with your cardiologist in one week, call to make an appointment.  Call your doctor if you have unusual chest pain, pressure, or discomfort, shortness of breath, nausea, vomiting, burping, heartburn, tingling upper body parts, sweating, cold, clammy sking, racing heartbeat  Call 911 if you think you are having a heart attack  Take all cardiac medications as prescribed - notify your doctor if you have any side effects  Follow cardiac diet - avoid fatty & fried foods, don't eat too much red meat, eat lots of fruits & vegetables, dairy products should be low fat  Lose weight if you are overweight.  monitor for bleeding or any black or dark stools

## 2019-08-13 NOTE — DISCHARGE NOTE NURSING/CASE MANAGEMENT/SOCIAL WORK - NSDCDPATPORTLINK_GEN_ALL_CORE
You can access the [facility] Portal, offered by [facility], by registering with the following website: [email or web address]

## 2019-08-13 NOTE — PROGRESS NOTE ADULT - PROBLEM SELECTOR PLAN 1
cardiac enzymes peaked, T-wave inversions on EKG in setting of intermittent L sided chest pain.   -s/p cath with DANNY to LAD yesterday, no cp, sob, feels well, discussed with cards stable for discharge home today   -TTE with normal LV and EF  -c/w brillanta, aspirin, statin  -per cards switch to toprol 25mg ER daily (asymptomatic warren during sleep) with good chrontropic response with walking  -start lisinopril 2.5mg daily

## 2019-08-13 NOTE — PROGRESS NOTE ADULT - ATTENDING COMMENTS
70 yo man s/p PCI in setting of ACS.  Continue DAPT, BB, start low dose ACEi.  Risk factor modification, medication compliance counselling provided .

## 2019-08-13 NOTE — PROGRESS NOTE ADULT - ASSESSMENT
68 yo male, no PMH, reported brother  of heart disease in his 40s.  He presents with intermittent chest pain since Tuesday morning, noted to have NSTEMI.  Currently asymptomatic.       REC:  #1.  Chest pain 2/2 NSTEMI s/p cath with LAD PCI.   - C/w DAPT with ASA and Brilinta for 1 year  - C/w atorvastatin 80 on d/c  - d/c on Toprol 25 daily.   - Start lisinopril 2.5 mg prior to d/c  - No further cardiac testing at this time.      Yesica Edwards MD  Cardiology Fellow - PGY 5  Text or Call: [phone]  For all New Consults and Questions:  [email or web address]   Login: [email or web address] 68 yo male, no PMH, reported brother  of heart disease in his 40s.  He presents with intermittent chest pain since Tuesday morning, noted to have N-STEMI.  Currently asymptomatic s/p PCI of LAD.       REC:  #1.  Chest pain 2/2 NSTEMI, s/p cath with LAD PCI.   - C/w DAPT with ASA and Brilinta for 1 year  - C/w atorvastatin 80 on d/c  - d/c on Toprol 25 daily.   - Start lisinopril 2.5 mg prior to d/c  - No further cardiac testing at this time.      Yesica Edwards MD  Cardiology Fellow - PGY 5  Text or Call: [phone]  For all New Consults and Questions:  [email or web address]   Login: [email or web address] M.D.  Cardiology Attending, Consult Service  [phone]

## 2019-08-13 NOTE — PROGRESS NOTE ADULT - PROVIDER SPECIALTY LIST ADULT
Cardiology
Hospitalist
Intervent Cardiology
Cardiology
Cardiology

## 2019-08-13 NOTE — DISCHARGE NOTE PROVIDER - HOSPITAL COURSE
69 year old male PMH of gastric ulcers admitted with complaints of chest pain, found to have elevated troponins and TWI on EKG consistent with NSTEMI.  Cardiology consulted, patient underwent cardiac catherization with PCI to LAD.  Echocardiogram with EF 53%.  Patient stable for discharge to home.  Will follow up with cardiologist in [place]. 69 year old male PMH of gastric ulcers admitted with complaints of chest pain, found to have elevated troponins and TWI on EKG consistent with NSTEMI.  Cardiology consulted, patient underwent cardiac catherization with PCI to LAD.  Echocardiogram with EF 53%.  Patient stable for discharge to home.  Will follow up with cardiologist in [place].             This patient is a 69yoM with PMH of gastric ulcers who presents to the ED with complaint of chest pain, found to have elevated troponins and TWI on EKG consistent with NSTEMI s/p DANNY to mid LAD             Problem/Plan - 1:    ·  Problem: NSTEMI (non-ST elevated myocardial infarction).  Plan: cardiac enzymes peaked, T-wave inversions on EKG in setting of intermittent L sided chest pain.     -s/p cath with DANNY to LAD yesterday, no cp, sob, feels well, discussed with cards stable for discharge home today     -TTE with normal LV and EF    -c/w brillanta, aspirin, statin    -per cards switch to toprol 25mg ER daily (asymptomatic warren during sleep) with good chrontropic response with walking    -start lisinopril 2.5mg daily.          Problem/Plan - 2:    ·  Problem: History of gastric ulcer.  Plan: hx of gastric ulcer, no s/s of bleeding, not on meds at home, hgb stable.          Problem/Plan - 3:    ·  Problem: Prophylactic measure.  Plan: VTE ppx: on heparin gtt    Dipso: discharge home today with outpatient cards in [place] Dr Lm Edwards within 1 -2 weeks and PCP Dr. Corrales    spent 42 min on discharge process time.
